# Patient Record
Sex: FEMALE | Race: BLACK OR AFRICAN AMERICAN | NOT HISPANIC OR LATINO | URBAN - METROPOLITAN AREA
[De-identification: names, ages, dates, MRNs, and addresses within clinical notes are randomized per-mention and may not be internally consistent; named-entity substitution may affect disease eponyms.]

---

## 2018-07-17 ENCOUNTER — EMERGENCY (EMERGENCY)
Facility: HOSPITAL | Age: 28
LOS: 1 days | Discharge: HOME | End: 2018-07-17
Attending: EMERGENCY MEDICINE | Admitting: EMERGENCY MEDICINE
Payer: MEDICAID

## 2018-07-17 VITALS
OXYGEN SATURATION: 98 % | SYSTOLIC BLOOD PRESSURE: 138 MMHG | HEART RATE: 68 BPM | RESPIRATION RATE: 18 BRPM | DIASTOLIC BLOOD PRESSURE: 95 MMHG

## 2018-07-17 VITALS
HEART RATE: 110 BPM | SYSTOLIC BLOOD PRESSURE: 143 MMHG | OXYGEN SATURATION: 98 % | TEMPERATURE: 98 F | RESPIRATION RATE: 18 BRPM | DIASTOLIC BLOOD PRESSURE: 95 MMHG

## 2018-07-17 LAB
AMPHET UR-MCNC: NEGATIVE — SIGNIFICANT CHANGE UP
APAP SERPL-MCNC: < 15 UG/ML — LOW (ref 15–25)
BARBITURATES UR SCN-MCNC: NEGATIVE — SIGNIFICANT CHANGE UP
BENZODIAZ UR-MCNC: NEGATIVE — SIGNIFICANT CHANGE UP
CANNABINOIDS UR-MCNC: POSITIVE — SIGNIFICANT CHANGE UP
COCAINE METAB.OTHER UR-MCNC: POSITIVE — SIGNIFICANT CHANGE UP
ETHANOL BLD-MCNC: < 10 MG/DL — SIGNIFICANT CHANGE UP
METHADONE UR-MCNC: NEGATIVE — SIGNIFICANT CHANGE UP
OPIATES UR-MCNC: NEGATIVE — SIGNIFICANT CHANGE UP
OXYCODONE UR-MCNC: NEGATIVE — SIGNIFICANT CHANGE UP
PCP UR-MCNC: NEGATIVE — SIGNIFICANT CHANGE UP
SALICYLATES SERPL-MCNC: < 5 MG/DL — LOW (ref 15–30)
TROPONIN T, HIGH SENSITIVITY: < 6 NG/L — SIGNIFICANT CHANGE UP (ref ?–14)

## 2018-07-17 PROCEDURE — 99284 EMERGENCY DEPT VISIT MOD MDM: CPT

## 2018-07-17 RX ADMIN — Medication 1 MILLIGRAM(S): at 15:17

## 2018-07-17 NOTE — ED PROVIDER NOTE - ATTENDING CONTRIBUTION TO CARE
Dr. Balderas: I have personally seen and examined this patient at the bedside. I have fully participated in the care of this patient. I have reviewed all pertinent clinical information, including history, physical exam, plan and the Resident's note and agree except as noted. HPI above as by me. PE above as by me. DDX drug intoxication and side effect. Neg reported or evidence of abuse. PLAN ativan, tox screen, upreg, observe.
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
## 2018-07-17 NOTE — ED PROVIDER NOTE - PHYSICAL EXAMINATION
Gen: teary, anxious appearing, AOx3  Head: NCAT  HEENT: PERRL, oral mucosa moist, normal conjunctiva  Lung: CTAB, no respiratory distress  CV: tachycardic, no murmurs, Normal perfusion  Abd: soft, NTND, no CVA tenderness  MSK: No edema, no visible deformities  Skin: numerous old scars on forearms  - Lena Phelps, DO

## 2018-07-17 NOTE — ED ADULT NURSE NOTE - CHPI ED SYMPTOMS NEG
no numbness/no tingling/no dizziness/no fever/no weakness/no nausea/no vomiting/no pain/no chills/no decreased eating/drinking
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
## 2018-07-17 NOTE — ED PROVIDER NOTE - CARE PLAN
Principal Discharge DX:	Drug intoxication  Assessment and plan of treatment:	- Please follow up with your primary doctor

## 2018-07-17 NOTE — ED PROVIDER NOTE - PROGRESS NOTE DETAILS
Pt is feeling much better, no longer has chest tightness, shakiness, blurry vision.  Pt informed of all lab results, but does not want a copy of the records.  Pt will be discharged and advised to follow up with her primary doctor.  Pt is calling her sister for a ride home.  - Lena Phelps,

## 2018-07-17 NOTE — ED PROVIDER NOTE - MEDICAL DECISION MAKING DETAILS
25F presents anxious, upset after smoking a blunt this morning.  chest pain, shortness of breath in setting of cocaine use last night.  Requesting drug scree.  Will obtian ekg, trop, tox screen, give ativan and reassess.  - Lena Phelps, DO
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
## 2019-11-01 ENCOUNTER — OUTPATIENT (OUTPATIENT)
Dept: OUTPATIENT SERVICES | Facility: HOSPITAL | Age: 29
LOS: 1 days | End: 2019-11-01

## 2019-11-01 ENCOUNTER — OUTPATIENT (OUTPATIENT)
Dept: OUTPATIENT SERVICES | Facility: HOSPITAL | Age: 29
LOS: 1 days | End: 2019-11-01
Payer: MEDICAID

## 2019-11-22 ENCOUNTER — EMERGENCY (EMERGENCY)
Facility: HOSPITAL | Age: 29
LOS: 1 days | Discharge: HOME | End: 2019-11-22
Attending: EMERGENCY MEDICINE
Payer: MEDICAID

## 2019-11-22 VITALS
HEART RATE: 70 BPM | WEIGHT: 98.11 LBS | RESPIRATION RATE: 16 BRPM | SYSTOLIC BLOOD PRESSURE: 132 MMHG | TEMPERATURE: 98 F | OXYGEN SATURATION: 98 % | DIASTOLIC BLOOD PRESSURE: 77 MMHG | HEIGHT: 64 IN

## 2019-11-22 DIAGNOSIS — Z90.49 ACQUIRED ABSENCE OF OTHER SPECIFIED PARTS OF DIGESTIVE TRACT: Chronic | ICD-10-CM

## 2019-11-22 DIAGNOSIS — Z98.890 OTHER SPECIFIED POSTPROCEDURAL STATES: Chronic | ICD-10-CM

## 2019-11-22 LAB
ACETONE SERPL-MCNC: NEGATIVE — SIGNIFICANT CHANGE UP
ALBUMIN SERPL ELPH-MCNC: 3.5 G/DL — SIGNIFICANT CHANGE UP (ref 3.5–5)
ALP SERPL-CCNC: 95 U/L — SIGNIFICANT CHANGE UP (ref 40–120)
ALT FLD-CCNC: 17 U/L DA — SIGNIFICANT CHANGE UP (ref 10–60)
AMPHET UR-MCNC: NEGATIVE — SIGNIFICANT CHANGE UP
ANION GAP SERPL CALC-SCNC: 8 MMOL/L — SIGNIFICANT CHANGE UP (ref 5–17)
APPEARANCE UR: CLEAR — SIGNIFICANT CHANGE UP
AST SERPL-CCNC: 15 U/L — SIGNIFICANT CHANGE UP (ref 10–40)
BACTERIA # UR AUTO: ABNORMAL /HPF
BARBITURATES UR SCN-MCNC: NEGATIVE — SIGNIFICANT CHANGE UP
BASOPHILS # BLD AUTO: 0.01 K/UL — SIGNIFICANT CHANGE UP (ref 0–0.2)
BASOPHILS NFR BLD AUTO: 0.2 % — SIGNIFICANT CHANGE UP (ref 0–2)
BENZODIAZ UR-MCNC: NEGATIVE — SIGNIFICANT CHANGE UP
BILIRUB SERPL-MCNC: 0.4 MG/DL — SIGNIFICANT CHANGE UP (ref 0.2–1.2)
BILIRUB UR-MCNC: NEGATIVE — SIGNIFICANT CHANGE UP
BUN SERPL-MCNC: 6 MG/DL — LOW (ref 7–18)
CALCIUM SERPL-MCNC: 9 MG/DL — SIGNIFICANT CHANGE UP (ref 8.4–10.5)
CHLORIDE SERPL-SCNC: 106 MMOL/L — SIGNIFICANT CHANGE UP (ref 96–108)
CO2 SERPL-SCNC: 26 MMOL/L — SIGNIFICANT CHANGE UP (ref 22–31)
COCAINE METAB.OTHER UR-MCNC: POSITIVE
COLOR SPEC: YELLOW — SIGNIFICANT CHANGE UP
COMMENT - URINE: SIGNIFICANT CHANGE UP
CREAT SERPL-MCNC: 0.79 MG/DL — SIGNIFICANT CHANGE UP (ref 0.5–1.3)
DIFF PNL FLD: NEGATIVE — SIGNIFICANT CHANGE UP
EOSINOPHIL # BLD AUTO: 0.1 K/UL — SIGNIFICANT CHANGE UP (ref 0–0.5)
EOSINOPHIL NFR BLD AUTO: 1.6 % — SIGNIFICANT CHANGE UP (ref 0–6)
EPI CELLS # UR: ABNORMAL /HPF
ERYTHROCYTE [SEDIMENTATION RATE] IN BLOOD: 30 MM/HR — HIGH (ref 0–15)
ETHANOL SERPL-MCNC: <3 MG/DL — SIGNIFICANT CHANGE UP (ref 0–10)
GLUCOSE SERPL-MCNC: 82 MG/DL — SIGNIFICANT CHANGE UP (ref 70–99)
GLUCOSE UR QL: NEGATIVE — SIGNIFICANT CHANGE UP
HCG SERPL-ACNC: <1 MIU/ML — SIGNIFICANT CHANGE UP
HCT VFR BLD CALC: 30.7 % — LOW (ref 34.5–45)
HGB BLD-MCNC: 9.8 G/DL — LOW (ref 11.5–15.5)
HIV 1 & 2 AB SERPL IA.RAPID: REACTIVE
IMM GRANULOCYTES NFR BLD AUTO: 0.3 % — SIGNIFICANT CHANGE UP (ref 0–1.5)
KETONES UR-MCNC: ABNORMAL
LACTATE SERPL-SCNC: 1.1 MMOL/L — SIGNIFICANT CHANGE UP (ref 0.7–2)
LEUKOCYTE ESTERASE UR-ACNC: NEGATIVE — SIGNIFICANT CHANGE UP
LYMPHOCYTES # BLD AUTO: 0.71 K/UL — LOW (ref 1–3.3)
LYMPHOCYTES # BLD AUTO: 11.3 % — LOW (ref 13–44)
MAGNESIUM SERPL-MCNC: 2.1 MG/DL — SIGNIFICANT CHANGE UP (ref 1.6–2.6)
MCHC RBC-ENTMCNC: 29.4 PG — SIGNIFICANT CHANGE UP (ref 27–34)
MCHC RBC-ENTMCNC: 31.9 GM/DL — LOW (ref 32–36)
MCV RBC AUTO: 92.2 FL — SIGNIFICANT CHANGE UP (ref 80–100)
METHADONE UR-MCNC: NEGATIVE — SIGNIFICANT CHANGE UP
MONOCYTES # BLD AUTO: 0.61 K/UL — SIGNIFICANT CHANGE UP (ref 0–0.9)
MONOCYTES NFR BLD AUTO: 9.7 % — SIGNIFICANT CHANGE UP (ref 2–14)
NEUTROPHILS # BLD AUTO: 4.85 K/UL — SIGNIFICANT CHANGE UP (ref 1.8–7.4)
NEUTROPHILS NFR BLD AUTO: 76.9 % — SIGNIFICANT CHANGE UP (ref 43–77)
NITRITE UR-MCNC: NEGATIVE — SIGNIFICANT CHANGE UP
NRBC # BLD: 0 /100 WBCS — SIGNIFICANT CHANGE UP (ref 0–0)
OPIATES UR-MCNC: NEGATIVE — SIGNIFICANT CHANGE UP
PCP SPEC-MCNC: SIGNIFICANT CHANGE UP
PCP UR-MCNC: NEGATIVE — SIGNIFICANT CHANGE UP
PH UR: 6.5 — SIGNIFICANT CHANGE UP (ref 5–8)
PLATELET # BLD AUTO: 232 K/UL — SIGNIFICANT CHANGE UP (ref 150–400)
POTASSIUM SERPL-MCNC: 3.5 MMOL/L — SIGNIFICANT CHANGE UP (ref 3.5–5.3)
POTASSIUM SERPL-SCNC: 3.5 MMOL/L — SIGNIFICANT CHANGE UP (ref 3.5–5.3)
PROT SERPL-MCNC: 7.8 G/DL — SIGNIFICANT CHANGE UP (ref 6–8.3)
PROT UR-MCNC: 15
RBC # BLD: 3.33 M/UL — LOW (ref 3.8–5.2)
RBC # FLD: 13.3 % — SIGNIFICANT CHANGE UP (ref 10.3–14.5)
RBC CASTS # UR COMP ASSIST: SIGNIFICANT CHANGE UP /HPF (ref 0–2)
SODIUM SERPL-SCNC: 140 MMOL/L — SIGNIFICANT CHANGE UP (ref 135–145)
SP GR SPEC: 1.01 — SIGNIFICANT CHANGE UP (ref 1.01–1.02)
THC UR QL: POSITIVE
UROBILINOGEN FLD QL: NEGATIVE — SIGNIFICANT CHANGE UP
WBC # BLD: 6.3 K/UL — SIGNIFICANT CHANGE UP (ref 3.8–10.5)
WBC # FLD AUTO: 6.3 K/UL — SIGNIFICANT CHANGE UP (ref 3.8–10.5)
WBC UR QL: SIGNIFICANT CHANGE UP /HPF (ref 0–5)

## 2019-11-22 PROCEDURE — 80307 DRUG TEST PRSMV CHEM ANLYZR: CPT

## 2019-11-22 PROCEDURE — 96374 THER/PROPH/DIAG INJ IV PUSH: CPT

## 2019-11-22 PROCEDURE — 71045 X-RAY EXAM CHEST 1 VIEW: CPT

## 2019-11-22 PROCEDURE — 99285 EMERGENCY DEPT VISIT HI MDM: CPT | Mod: 25

## 2019-11-22 PROCEDURE — 85027 COMPLETE CBC AUTOMATED: CPT

## 2019-11-22 PROCEDURE — 86703 HIV-1/HIV-2 1 RESULT ANTBDY: CPT

## 2019-11-22 PROCEDURE — 80053 COMPREHEN METABOLIC PANEL: CPT

## 2019-11-22 PROCEDURE — 36415 COLL VENOUS BLD VENIPUNCTURE: CPT

## 2019-11-22 PROCEDURE — 71045 X-RAY EXAM CHEST 1 VIEW: CPT | Mod: 26

## 2019-11-22 PROCEDURE — 83605 ASSAY OF LACTIC ACID: CPT

## 2019-11-22 PROCEDURE — 81001 URINALYSIS AUTO W/SCOPE: CPT

## 2019-11-22 PROCEDURE — 83735 ASSAY OF MAGNESIUM: CPT

## 2019-11-22 PROCEDURE — 99285 EMERGENCY DEPT VISIT HI MDM: CPT

## 2019-11-22 PROCEDURE — 96375 TX/PRO/DX INJ NEW DRUG ADDON: CPT

## 2019-11-22 PROCEDURE — 82009 KETONE BODYS QUAL: CPT

## 2019-11-22 PROCEDURE — 85652 RBC SED RATE AUTOMATED: CPT

## 2019-11-22 PROCEDURE — 87086 URINE CULTURE/COLONY COUNT: CPT

## 2019-11-22 PROCEDURE — 84702 CHORIONIC GONADOTROPIN TEST: CPT

## 2019-11-22 PROCEDURE — 87040 BLOOD CULTURE FOR BACTERIA: CPT

## 2019-11-22 RX ORDER — AZITHROMYCIN 500 MG/1
500 TABLET, FILM COATED ORAL ONCE
Refills: 0 | Status: COMPLETED | OUTPATIENT
Start: 2019-11-22 | End: 2019-11-22

## 2019-11-22 RX ORDER — CEFTRIAXONE 500 MG/1
1000 INJECTION, POWDER, FOR SOLUTION INTRAMUSCULAR; INTRAVENOUS ONCE
Refills: 0 | Status: COMPLETED | OUTPATIENT
Start: 2019-11-22 | End: 2019-11-22

## 2019-11-22 RX ORDER — SODIUM CHLORIDE 9 MG/ML
1400 INJECTION INTRAMUSCULAR; INTRAVENOUS; SUBCUTANEOUS ONCE
Refills: 0 | Status: COMPLETED | OUTPATIENT
Start: 2019-11-22 | End: 2019-11-22

## 2019-11-22 RX ORDER — SODIUM CHLORIDE 9 MG/ML
1000 INJECTION INTRAMUSCULAR; INTRAVENOUS; SUBCUTANEOUS
Refills: 0 | Status: DISCONTINUED | OUTPATIENT
Start: 2019-11-22 | End: 2019-11-28

## 2019-11-22 RX ADMIN — SODIUM CHLORIDE 2800 MILLILITER(S): 9 INJECTION INTRAMUSCULAR; INTRAVENOUS; SUBCUTANEOUS at 11:55

## 2019-11-22 RX ADMIN — AZITHROMYCIN 255 MILLIGRAM(S): 500 TABLET, FILM COATED ORAL at 11:54

## 2019-11-22 RX ADMIN — SODIUM CHLORIDE 1250 MILLILITER(S): 9 INJECTION INTRAMUSCULAR; INTRAVENOUS; SUBCUTANEOUS at 11:54

## 2019-11-22 RX ADMIN — CEFTRIAXONE 100 MILLIGRAM(S): 500 INJECTION, POWDER, FOR SOLUTION INTRAMUSCULAR; INTRAVENOUS at 11:54

## 2019-11-22 NOTE — ED ADULT NURSE NOTE - NSIMPLEMENTINTERV_GEN_ALL_ED
Implemented All Universal Safety Interventions:  [place] to call system. Call bell, personal items and telephone within reach. Instruct patient to call for assistance. Room bathroom lighting operational. Non-slip footwear when patient is off stretcher. Physically safe environment: no spills, clutter or unnecessary equipment. Stretcher in lowest position, wheels locked, appropriate side rails in place.

## 2019-11-22 NOTE — ED PROVIDER NOTE - MUSCULOSKELETAL, MLM
Spine appears normal, range of motion is not limited, no muscle or joint tenderness Spine appears normal, range of motion is not limited, no muscle or joint tenderness, ? LCVAT

## 2019-11-22 NOTE — ED PROVIDER NOTE - CLINICAL SUMMARY MEDICAL DECISION MAKING FREE TEXT BOX
pt with lots of complaints, had CT head/A/P sone earlier @ [facility]-neg, c/o weakness, coughing, will get labs, admission

## 2019-11-22 NOTE — ED ADULT TRIAGE NOTE - CHIEF COMPLAINT QUOTE
" my nervous system is shutting down ", " I have full blown AIDS and my motors skills are bad " , I need help taking care of myself

## 2019-11-22 NOTE — ED PROVIDER NOTE - NEUROLOGICAL, MLM
Alert and oriented, no focal deficits, no motor or sensory deficits. Alert and oriented, no focal deficits, no motor or sensory deficits. pt with self induced tremors, able to ambulate

## 2019-11-22 NOTE — ED PROVIDER NOTE - PROGRESS NOTE DETAILS
Pt claims she needs to be babysat 2/2 weakness, requests to be admitted, d/w Dr. Leo, pt does not required admission, pt is able to ambulate, will d/c home.  Pt with no urinary/fecal incontinence in ED
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
## 2019-11-23 LAB
CULTURE RESULTS: SIGNIFICANT CHANGE UP
SPECIMEN SOURCE: SIGNIFICANT CHANGE UP

## 2019-11-25 DIAGNOSIS — Z71.89 OTHER SPECIFIED COUNSELING: ICD-10-CM

## 2019-11-26 DIAGNOSIS — Z76.89 PERSONS ENCOUNTERING HEALTH SERVICES IN OTHER SPECIFIED CIRCUMSTANCES: ICD-10-CM

## 2019-11-27 LAB
CULTURE RESULTS: SIGNIFICANT CHANGE UP
CULTURE RESULTS: SIGNIFICANT CHANGE UP
SPECIMEN SOURCE: SIGNIFICANT CHANGE UP
SPECIMEN SOURCE: SIGNIFICANT CHANGE UP

## 2020-01-10 ENCOUNTER — EMERGENCY (EMERGENCY)
Facility: HOSPITAL | Age: 30
LOS: 1 days | Discharge: HOME | End: 2020-01-10
Attending: EMERGENCY MEDICINE | Admitting: EMERGENCY MEDICINE
Payer: MEDICAID

## 2020-01-10 VITALS
TEMPERATURE: 98 F | HEART RATE: 90 BPM | OXYGEN SATURATION: 100 % | SYSTOLIC BLOOD PRESSURE: 148 MMHG | DIASTOLIC BLOOD PRESSURE: 70 MMHG | RESPIRATION RATE: 18 BRPM

## 2020-01-10 DIAGNOSIS — Z98.890 OTHER SPECIFIED POSTPROCEDURAL STATES: Chronic | ICD-10-CM

## 2020-01-10 DIAGNOSIS — Z90.49 ACQUIRED ABSENCE OF OTHER SPECIFIED PARTS OF DIGESTIVE TRACT: Chronic | ICD-10-CM

## 2020-01-10 PROBLEM — B20 HUMAN IMMUNODEFICIENCY VIRUS [HIV] DISEASE: Chronic | Status: ACTIVE | Noted: 2019-11-22

## 2020-01-10 LAB
ALBUMIN SERPL ELPH-MCNC: 4.7 G/DL — SIGNIFICANT CHANGE UP (ref 3.3–5)
ALP SERPL-CCNC: 78 U/L — SIGNIFICANT CHANGE UP (ref 40–120)
ALT FLD-CCNC: 8 U/L — SIGNIFICANT CHANGE UP (ref 4–33)
ANION GAP SERPL CALC-SCNC: 13 MMO/L — SIGNIFICANT CHANGE UP (ref 7–14)
APPEARANCE UR: CLEAR — SIGNIFICANT CHANGE UP
AST SERPL-CCNC: 21 U/L — SIGNIFICANT CHANGE UP (ref 4–32)
BASOPHILS # BLD AUTO: 0.03 K/UL — SIGNIFICANT CHANGE UP (ref 0–0.2)
BASOPHILS NFR BLD AUTO: 0.8 % — SIGNIFICANT CHANGE UP (ref 0–2)
BILIRUB SERPL-MCNC: 0.4 MG/DL — SIGNIFICANT CHANGE UP (ref 0.2–1.2)
BILIRUB UR-MCNC: NEGATIVE — SIGNIFICANT CHANGE UP
BLOOD UR QL VISUAL: NEGATIVE — SIGNIFICANT CHANGE UP
BUN SERPL-MCNC: 10 MG/DL — SIGNIFICANT CHANGE UP (ref 7–23)
CALCIUM SERPL-MCNC: 10 MG/DL — SIGNIFICANT CHANGE UP (ref 8.4–10.5)
CHLORIDE SERPL-SCNC: 102 MMOL/L — SIGNIFICANT CHANGE UP (ref 98–107)
CO2 SERPL-SCNC: 23 MMOL/L — SIGNIFICANT CHANGE UP (ref 22–31)
COLOR SPEC: COLORLESS — SIGNIFICANT CHANGE UP
CREAT SERPL-MCNC: 0.83 MG/DL — SIGNIFICANT CHANGE UP (ref 0.5–1.3)
EOSINOPHIL # BLD AUTO: 0.07 K/UL — SIGNIFICANT CHANGE UP (ref 0–0.5)
EOSINOPHIL NFR BLD AUTO: 1.9 % — SIGNIFICANT CHANGE UP (ref 0–6)
GLUCOSE SERPL-MCNC: 84 MG/DL — SIGNIFICANT CHANGE UP (ref 70–99)
GLUCOSE UR-MCNC: NEGATIVE — SIGNIFICANT CHANGE UP
HCT VFR BLD CALC: 35.6 % — SIGNIFICANT CHANGE UP (ref 34.5–45)
HGB BLD-MCNC: 11.5 G/DL — SIGNIFICANT CHANGE UP (ref 11.5–15.5)
IMM GRANULOCYTES NFR BLD AUTO: 0 % — SIGNIFICANT CHANGE UP (ref 0–1.5)
KETONES UR-MCNC: NEGATIVE — SIGNIFICANT CHANGE UP
LEUKOCYTE ESTERASE UR-ACNC: NEGATIVE — SIGNIFICANT CHANGE UP
LYMPHOCYTES # BLD AUTO: 0.76 K/UL — LOW (ref 1–3.3)
LYMPHOCYTES # BLD AUTO: 21.1 % — SIGNIFICANT CHANGE UP (ref 13–44)
MCHC RBC-ENTMCNC: 30.9 PG — SIGNIFICANT CHANGE UP (ref 27–34)
MCHC RBC-ENTMCNC: 32.3 % — SIGNIFICANT CHANGE UP (ref 32–36)
MCV RBC AUTO: 95.7 FL — SIGNIFICANT CHANGE UP (ref 80–100)
MONOCYTES # BLD AUTO: 0.42 K/UL — SIGNIFICANT CHANGE UP (ref 0–0.9)
MONOCYTES NFR BLD AUTO: 11.6 % — SIGNIFICANT CHANGE UP (ref 2–14)
NEUTROPHILS # BLD AUTO: 2.33 K/UL — SIGNIFICANT CHANGE UP (ref 1.8–7.4)
NEUTROPHILS NFR BLD AUTO: 64.6 % — SIGNIFICANT CHANGE UP (ref 43–77)
NITRITE UR-MCNC: NEGATIVE — SIGNIFICANT CHANGE UP
NRBC # FLD: 0 K/UL — SIGNIFICANT CHANGE UP (ref 0–0)
PH UR: 7.5 — SIGNIFICANT CHANGE UP (ref 5–8)
PLATELET # BLD AUTO: 294 K/UL — SIGNIFICANT CHANGE UP (ref 150–400)
PMV BLD: 10.5 FL — SIGNIFICANT CHANGE UP (ref 7–13)
POTASSIUM SERPL-MCNC: 3.5 MMOL/L — SIGNIFICANT CHANGE UP (ref 3.5–5.3)
POTASSIUM SERPL-SCNC: 3.5 MMOL/L — SIGNIFICANT CHANGE UP (ref 3.5–5.3)
PROT SERPL-MCNC: 8.4 G/DL — HIGH (ref 6–8.3)
PROT UR-MCNC: NEGATIVE — SIGNIFICANT CHANGE UP
RBC # BLD: 3.72 M/UL — LOW (ref 3.8–5.2)
RBC # FLD: 14.4 % — SIGNIFICANT CHANGE UP (ref 10.3–14.5)
SODIUM SERPL-SCNC: 138 MMOL/L — SIGNIFICANT CHANGE UP (ref 135–145)
SP GR SPEC: 1.01 — SIGNIFICANT CHANGE UP (ref 1–1.04)
UROBILINOGEN FLD QL: NORMAL — SIGNIFICANT CHANGE UP
WBC # BLD: 3.61 K/UL — LOW (ref 3.8–10.5)
WBC # FLD AUTO: 3.61 K/UL — LOW (ref 3.8–10.5)

## 2020-01-10 PROCEDURE — 99285 EMERGENCY DEPT VISIT HI MDM: CPT

## 2020-01-10 PROCEDURE — 71250 CT THORAX DX C-: CPT | Mod: 26

## 2020-01-10 RX ORDER — SODIUM CHLORIDE 9 MG/ML
1000 INJECTION INTRAMUSCULAR; INTRAVENOUS; SUBCUTANEOUS ONCE
Refills: 0 | Status: COMPLETED | OUTPATIENT
Start: 2020-01-10 | End: 2020-01-10

## 2020-01-10 RX ORDER — HALOPERIDOL DECANOATE 100 MG/ML
5 INJECTION INTRAMUSCULAR ONCE
Refills: 0 | Status: COMPLETED | OUTPATIENT
Start: 2020-01-10 | End: 2020-01-10

## 2020-01-10 RX ORDER — IPRATROPIUM/ALBUTEROL SULFATE 18-103MCG
3 AEROSOL WITH ADAPTER (GRAM) INHALATION ONCE
Refills: 0 | Status: COMPLETED | OUTPATIENT
Start: 2020-01-10 | End: 2020-01-10

## 2020-01-10 RX ADMIN — SODIUM CHLORIDE 1000 MILLILITER(S): 9 INJECTION INTRAMUSCULAR; INTRAVENOUS; SUBCUTANEOUS at 22:37

## 2020-01-10 RX ADMIN — HALOPERIDOL DECANOATE 5 MILLIGRAM(S): 100 INJECTION INTRAMUSCULAR at 19:40

## 2020-01-10 RX ADMIN — Medication 2 MILLIGRAM(S): at 19:40

## 2020-01-10 RX ADMIN — Medication 3 MILLILITER(S): at 18:37

## 2020-01-10 NOTE — ED PROVIDER NOTE - ATTENDING CONTRIBUTION TO CARE
Attending Statement: I have personally seen and examined this patient. I have fully participated in the care of this patient. I have reviewed all pertinent clinical information, including history physical exam, plan and the Resident's note and agree except as noted  28yo F hx of AIDs on HAART with reported CD4 16 and viral load 40 , GERD, ?DM, from home co sob and wheezing. Very poor historian. Stating "I need oxygen and an inhaler" Denies fever no chills no cough Feels SOB, no vomit asking for food. no diarrhea Denies preg.   Vital signs noted. laying down, no distress. mmm. pulse ox 100 RA. normal S1-S2 coarse bs no retractions. thin female nontender abdomen. no pedal edema. no calf tenderness. normal pulses bilateral feet. Ao3  plan labs, ct chest, tele monitoring, re assess Attending Statement: I have personally seen and examined this patient. I have fully participated in the care of this patient. I have reviewed all pertinent clinical information, including history physical exam, plan and the Resident's note and agree except as noted  30yo F hx of AIDs on HAART with reported CD4 16 and viral load 40 , GERD, ?DM, from home co sob and wheezing. Very poor historian. Stating "I need oxygen and an inhaler" Denies fever no chills no cough Feels SOB, no vomit asking for food. no diarrhea Denies preg. Evaluated pt w Dr Argueta  Vital signs noted. laying down, no distress. mmm. pulse ox 100 RA. normal S1-S2 coarse bs no retractions. thin female nontender abdomen. no pedal edema. no calf tenderness. normal pulses bilateral feet. Ao3 +flight of ideas able to redirect pt. pleasant, smiling  during interview.   plan labs, ct chest, tele monitoring, re assess

## 2020-01-10 NOTE — ED ADULT NURSE NOTE - NSIMPLEMENTINTERV_GEN_ALL_ED
Implemented All Fall Risk Interventions:  [place] to call system. Call bell, personal items and telephone within reach. Instruct patient to call for assistance. Room bathroom lighting operational. Non-slip footwear when patient is off stretcher. Physically safe environment: no spills, clutter or unnecessary equipment. Stretcher in lowest position, wheels locked, appropriate side rails in place. Provide visual cue, wrist band, yellow gown, etc. Monitor gait and stability. Monitor for mental status changes and reorient to person, place, and time. Review medications for side effects contributing to fall risk. Reinforce activity limits and safety measures with patient and family.

## 2020-01-10 NOTE — ED BEHAVIORAL HEALTH NOTE - BEHAVIORAL HEALTH NOTE
Attempted to interview patient but she was unable to sustain attention to have meaningful encounter, due to sedation. Will reattempt later in evening.

## 2020-01-10 NOTE — ED PROVIDER NOTE - NSFOLLOWUPINSTRUCTIONS_ED_ALL_ED_FT
Home Please follow up with the [facility] inn 48 hours. Return to the Emergency Department for any worsening or concerning symptoms. Continue to take all of you previously prescribed medications as directed.

## 2020-01-10 NOTE — ED ADULT TRIAGE NOTE - CHIEF COMPLAINT QUOTE
Pt refusing to answer questions in triage stating "respect my HIPAA" and breathing with ease on room air. As per EMS, pt c/o SOB and burning sensation to the chest. PMH HIV+ on biktarvy, ovarian CA GERD, manic depression/anxiety/ptsd.

## 2020-01-10 NOTE — ED PROVIDER NOTE - PHYSICAL EXAMINATION
GEN APPEARANCE: WDWN, alert and cooperative, non-toxic appearing and in NAD, eccentric appearance with pink fabio bear at bedside   HEAD: Atraumatic, normocephalic   EYES: PERRLa, EOMI, vision grossly intact.   EARS: Gross hearing intact.   NOSE: No nasal discharge, no external evidence of epistaxis.   NECK: Supple  CV: RRR, S1S2, no c/r/m/g. No cyanosis or pallor. Extremities warm, well perfused. Cap refill <2 seconds. No bruits.   LUNGS: CTAB. No wheezing. No rales. No rhonchi. No diminished breath sounds.   ABDOMEN: diffuse abdominal discomfort, No guarding or rebound. No masses.   MSK: Spine appears normal, no spine point tenderness. No CVA ttp. No joint erythema or tenderness. Normal muscular development. Pelvis stable.  EXTREMITIES: No peripheral edema. No obvious joint or bony deformity.  NEURO: Alert, follows commands. Weight bearing normal. Speech normal. Sensation and motor normal x4 extremities.   SKIN: Normal color for race, warm, dry and intact. No evidence of rash.  PSYCH: eccentric requires frequent redirection poor historian non linear conversation .

## 2020-01-10 NOTE — ED PROVIDER NOTE - NSFOLLOWUPCLINICS_GEN_ALL_ED_FT
[facility]  Behavioral Health  [address] [place] [address]  Phone: [phone]  Fax:   Follow Up Time: 1-3 Days

## 2020-01-10 NOTE — ED PROVIDER NOTE - OBJECTIVE STATEMENT
29F complex medical hx, HIV/AIDS on HAART last CD4 16, viral load 40? reports hx of cva, "cancerous cells everywhere", "parkinsonian like shaking", herniated discs, gerd, high blood sugar, hernia repair, "I have every medical problem imaginable" presents with a  cc of chest discomfort and shortness of breath, says "I need oxygen and a rescue inhaler", came to hospital also because living situation is "bad dirty, I share a room with a 14 year old boy and there are x3 dogs and mold everywhere", intermittently will interrupt conversation to say "im sick I need help" poor historian, requires frequent redirection. +abdominal pain, unable to characterize or localize, Denies n/v/f/c. Denies headache, syncope, lightheadedness, dizziness. Denies chest palpitations, Denies dysuria, hematuria, hematochezia, BRBPR, tarry stools, diarrhea, constipation. Reports is not on any ppx abx, "all medications make me sick except for my AIDS meds"

## 2020-01-10 NOTE — ED PROVIDER NOTE - PROGRESS NOTE DETAILS
PT gradually becoming more aggressive. Hyperreligious and threatening to the staff, threatening to bite and kill everyone. Yelling profanity and racial slurs to staff. Grandiose statements, She is very good friends w  of hospital. Unable to calm  pt, will medicate and place in .  attending made aware. pt pending ct chest, she refused, yelling and cursing at transporter. pt sedated, off 4 points. BP in 90's will give IVF and re assess. pt still sedated in BH w improved BP. Pending tox and BH evaluation. Endorse to Dr Walls Srinivasan: pt carrie dout to me pending psych assessment. pt has AIDS with new psychosis/agitation requiring sedation. will obtain CT head and serum tox. Srinivasan: pt reassessed, calm and cooperative, CT head unremarkable. PT seen and cleared by psych. will d/c.

## 2020-01-10 NOTE — ED PROVIDER NOTE - PATIENT PORTAL LINK FT
You can access the FollowMyHealth Patient Portal offered by [facility] by registering at the following website: [email or web address]. By joining [facility]’s FollowMyHealth portal, you will also be able to view your health information using other applications (apps) compatible with our system.

## 2020-01-10 NOTE — ED PROVIDER NOTE - CLINICAL SUMMARY MEDICAL DECISION MAKING FREE TEXT BOX
[place] PGY3: 29F complex medical hx, HIV on HAART last CD4 16, viral load 40? reports hx of cva, "cancerous cells everywhere", "parkinsonian like shaking", herniated discs, gerd, high blood sugar, hernia repair, "I have every medical problem imaginable" presents with a  cc of chest discomfort and shortness of breath, says "I need oxygen and a rescue inhaler" exam vss non toxic non focal exam, appearance and behavior as above, unclear etiology of SOB, not wheezy, will eval for infxn/pcp pna check labs cd4/viral load, ct chest, ua reassess

## 2020-01-11 VITALS
HEART RATE: 70 BPM | RESPIRATION RATE: 16 BRPM | DIASTOLIC BLOOD PRESSURE: 62 MMHG | SYSTOLIC BLOOD PRESSURE: 104 MMHG | OXYGEN SATURATION: 100 % | TEMPERATURE: 98 F

## 2020-01-11 DIAGNOSIS — F43.20 ADJUSTMENT DISORDER, UNSPECIFIED: ICD-10-CM

## 2020-01-11 LAB
AMPHET UR-MCNC: NEGATIVE — SIGNIFICANT CHANGE UP
APAP SERPL-MCNC: < 15 UG/ML — LOW (ref 15–25)
BARBITURATES UR SCN-MCNC: NEGATIVE — SIGNIFICANT CHANGE UP
BENZODIAZ UR-MCNC: NEGATIVE — SIGNIFICANT CHANGE UP
CANNABINOIDS UR-MCNC: POSITIVE — SIGNIFICANT CHANGE UP
COCAINE METAB.OTHER UR-MCNC: NEGATIVE — SIGNIFICANT CHANGE UP
ETHANOL BLD-MCNC: < 10 MG/DL — SIGNIFICANT CHANGE UP
HIV-1 VIRAL LOAD RESULT: SIGNIFICANT CHANGE UP
HIV1 RNA # SERPL NAA+PROBE: SIGNIFICANT CHANGE UP
HIV1 RNA SER-IMP: SIGNIFICANT CHANGE UP
HIV1 RNA SERPL NAA+PROBE-ACNC: SIGNIFICANT CHANGE UP
HIV1 RNA SERPL NAA+PROBE-LOG#: SIGNIFICANT CHANGE UP
METHADONE UR-MCNC: NEGATIVE — SIGNIFICANT CHANGE UP
OPIATES UR-MCNC: NEGATIVE — SIGNIFICANT CHANGE UP
OXYCODONE UR-MCNC: NEGATIVE — SIGNIFICANT CHANGE UP
PCP UR-MCNC: NEGATIVE — SIGNIFICANT CHANGE UP
SALICYLATES SERPL-MCNC: < 5 MG/DL — LOW (ref 15–30)
TSH SERPL-MCNC: 4.63 UIU/ML — HIGH (ref 0.27–4.2)

## 2020-01-11 PROCEDURE — 70450 CT HEAD/BRAIN W/O DYE: CPT | Mod: 26

## 2020-01-11 PROCEDURE — 90792 PSYCH DIAG EVAL W/MED SRVCS: CPT

## 2020-01-11 NOTE — ED BEHAVIORAL HEALTH ASSESSMENT NOTE - DETAILS
n/a pt agitated and somewhat aggressive in the ED when she first arrived during medical evaluation chronic back pain self-referred, ED physician notified of clearance to be discharged

## 2020-01-11 NOTE — ED BEHAVIORAL HEALTH ASSESSMENT NOTE - DESCRIPTION (FIRST USE, LAST USE, QUANTITY, FREQUENCY, DURATION)
put a very small amount of red wine in her tea each morning (reports it adds up to a total of one glass per week) puts a small amount of marijuana in tea each morning hx of daily use, in early remission, has not used for a least several months
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
## 2020-01-11 NOTE — ED BEHAVIORAL HEALTH NOTE - BEHAVIORAL HEALTH NOTE
Per request of provider, SHEBA provided [employer] #[id number] to nursing staff for patient discharge.

## 2020-01-11 NOTE — ED ADULT NURSE REASSESSMENT NOTE - NS ED NURSE REASSESS COMMENT FT1
0830 Received report from night RN pt calm & cooperative lying on stretcher in nad requesting to be discharge, pt denies si/hi/avh presently, safety & comfort measures maintained eval on going
Evaluated by Psych cleared by MD. Pt calm & cooperative denies s/i h/avh presently,  pt d/c by MD resources & d/c instructions, including [employer]card provided pt verbalizing understanding.
PT has been resting comfortably in bed absent any distress
PT woke up verbally aggressive, and demanding to see her MD. PT refusing to do blood work or submit a urine sample.  PT able to be re-directed and after speaking with MD PT fell back asleep.
Patient with manic behavior, stating her hipa rights have been violated, Patient stating she wanted patient advocate, called [phone] while being triaged . IV placed, labs sent as ordered. Patient became verbally abusive and verbally aggressive when transported came to take her to ct scan. Patient yelling at staff in the hallway and uncooperative. Patient using foul language and shouting at security. Patient restrained for safety and brought to  area.
md made aware of pt most recent vital signs. pt able to answer questions and is still verbally abusive. pt pleads to be left alone at this time. awaiting md orders in relation to her BP. pt remains in front of nurses station and will continue to monitor.
pt calm in stretcher at this time. pt moved in front of the nurses station. pt belongings counted with [employer]. belongings sealed in envelope locked up. will continue to monitor pt
pt resting comfortably in bed absent any distress or C/O pain. safety measures in place, able to make needs known with care provided PRN PT's B/P returned to normal level after fluid bolus
Pt medicated and placed in 4 point restraints and transferred safely to Behavioral Health Area with security and RN escort. Pt remained in Constant Observation. Safety maintained, Pt & restraints checked q 15 mins. @ 2025 Pt calm and sleeping, arousable to verbal stimuli, able to be redirected and agreed to change into Behavioral Health Pt attire. Belongings taken off patient, documented and secured. Restraints D/C, pt showing no signs of aggression or threats to self or others. Pt stating she is tired and wants to sleep. Education provided about Behavioral Health area and processes. Safety maintained. report given to primary RN Jonatan. Psych Provider to assess. Pt moved inside locked unit and placed in front of nursing station. Currently resting comfortably.
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
## 2020-01-11 NOTE — ED BEHAVIORAL HEALTH ASSESSMENT NOTE - RISK ASSESSMENT
Risk factors include remote hx of psychiatric illness requiring several hospitalizations and medication management, remote hx of NSSIB, limited education and unemployment, chronic medical illness (HIV) and pain (back pain), hx of substance abuse, and what appears to be low frustration tolerance with related agitation at times. Protective factors include no SI/I/P and HI/I/P, no hx of SAs, no recent hospitalizations, no evidence of an acute mood or psychotic disorder/episode, stable domicile, strong social supports (aunt, parents who lives in [place], sister who lives in [place]), beloved dogs (who she identifies as protective), future orientation, Sabianism (with related supportive Jew community), minimal substance use (small amount of EtOH and marijuana, no longer using cocaine), and no access to a gun. At this time, protective factors mitigate risks and pt can be discharged home. Pt provided with Crisis Center information and plans to see a psychologist "Mr. DALLAS" for initial evaluation at [facility] on this coming Monday 1/13. Low Acute Suicide Risk

## 2020-01-11 NOTE — ED BEHAVIORAL HEALTH ASSESSMENT NOTE - SUICIDE PROTECTIVE FACTORS
Supportive social network of family or friends/Identifies reasons for living/Responsibility to family and others/Has future plans/Beloved pets/Adventist beliefs

## 2020-01-11 NOTE — ED BEHAVIORAL HEALTH ASSESSMENT NOTE - SAFETY PLAN DETAILS
Pt agreeable to return to the ED or call 911 if she believes herself or others to be in imminent danger. Reports ability to ask her aunt and/or her parents or sister for help if need be. Plans to see a psychologist at [facility] on Monday.
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
## 2020-01-11 NOTE — ED BEHAVIORAL HEALTH ASSESSMENT NOTE - REFERRAL / APPOINTMENT DETAILS
Pt is not interested in referral to a psychiatrist and plans to attend an appt on Monday 1/13/20 at [facility] with a psychologist Pt is not interested in referral to a psychiatrist and plans to attend an appt on Monday 1/13/20 at [facility] with a psychologist.  Advised to availability of [facility] available for walk-ins Monday-Friday, 9am-7pm.

## 2020-01-11 NOTE — ED BEHAVIORAL HEALTH ASSESSMENT NOTE - NS ED [facility] ED COURSE PSYCHIATRIC MEDICATION GIVEN
Yes Involuntary Intramuscular (indication, name, dose, time) Advancement Flap (Single) Text: The defect edges were debeveled with a #15 scalpel blade.  Given the location of the defect and the proximity to free margins a single advancement flap was deemed most appropriate.  Using a sterile surgical marker, an appropriate advancement flap was drawn incorporating the defect and placing the expected incisions within the relaxed skin tension lines where possible.    The area thus outlined was incised deep to adipose tissue with a #15 scalpel blade.  The skin margins were undermined to an appropriate distance in all directions utilizing iris scissors.

## 2020-01-11 NOTE — ED BEHAVIORAL HEALTH ASSESSMENT NOTE - OTHER PAST PSYCHIATRIC HISTORY (INCLUDE DETAILS REGARDING ONSET, COURSE OF ILLNESS, INPATIENT/OUTPATIENT TREATMENT)
PPHx bipolar disorder (per hx of evaluation more than 15 years ago), hx of approximately 4 hospitalization between the ages of 11 and 16 (none recent), no hx of SAs, hx of NSSIB via cutting as a child/adolescent, not in any outpt psych treatment since age 16 and not prescribed any psychotropic meds at present

## 2020-01-12 LAB
BACTERIA UR CULT: SIGNIFICANT CHANGE UP
SPECIMEN SOURCE: SIGNIFICANT CHANGE UP

## 2020-01-20 ENCOUNTER — INPATIENT (INPATIENT)
Facility: HOSPITAL | Age: 30
LOS: 10 days | Discharge: HOME | End: 2020-01-31
Attending: PSYCHIATRY & NEUROLOGY | Admitting: PSYCHIATRY & NEUROLOGY
Payer: MEDICAID

## 2020-01-20 VITALS
OXYGEN SATURATION: 100 % | HEART RATE: 77 BPM | SYSTOLIC BLOOD PRESSURE: 131 MMHG | DIASTOLIC BLOOD PRESSURE: 91 MMHG | TEMPERATURE: 98 F | RESPIRATION RATE: 163 BRPM

## 2020-01-20 DIAGNOSIS — F10.10 ALCOHOL ABUSE, UNCOMPLICATED: ICD-10-CM

## 2020-01-20 DIAGNOSIS — F12.10 CANNABIS ABUSE, UNCOMPLICATED: ICD-10-CM

## 2020-01-20 DIAGNOSIS — Z90.49 ACQUIRED ABSENCE OF OTHER SPECIFIED PARTS OF DIGESTIVE TRACT: Chronic | ICD-10-CM

## 2020-01-20 DIAGNOSIS — F29 UNSPECIFIED PSYCHOSIS NOT DUE TO A SUBSTANCE OR KNOWN PHYSIOLOGICAL CONDITION: ICD-10-CM

## 2020-01-20 DIAGNOSIS — Z98.890 OTHER SPECIFIED POSTPROCEDURAL STATES: Chronic | ICD-10-CM

## 2020-01-20 LAB
ALBUMIN SERPL ELPH-MCNC: 4 G/DL — SIGNIFICANT CHANGE UP (ref 3.3–5)
ALP SERPL-CCNC: 71 U/L — SIGNIFICANT CHANGE UP (ref 40–120)
ALT FLD-CCNC: 8 U/L — SIGNIFICANT CHANGE UP (ref 4–33)
AMPHET UR-MCNC: NEGATIVE — SIGNIFICANT CHANGE UP
ANION GAP SERPL CALC-SCNC: 14 MMO/L — SIGNIFICANT CHANGE UP (ref 7–14)
APAP SERPL-MCNC: < 15 UG/ML — LOW (ref 15–25)
APPEARANCE UR: SIGNIFICANT CHANGE UP
AST SERPL-CCNC: 19 U/L — SIGNIFICANT CHANGE UP (ref 4–32)
BARBITURATES UR SCN-MCNC: NEGATIVE — SIGNIFICANT CHANGE UP
BASOPHILS # BLD AUTO: 0.01 K/UL — SIGNIFICANT CHANGE UP (ref 0–0.2)
BASOPHILS NFR BLD AUTO: 0.3 % — SIGNIFICANT CHANGE UP (ref 0–2)
BENZODIAZ UR-MCNC: NEGATIVE — SIGNIFICANT CHANGE UP
BILIRUB SERPL-MCNC: < 0.2 MG/DL — LOW (ref 0.2–1.2)
BILIRUB UR-MCNC: NEGATIVE — SIGNIFICANT CHANGE UP
BLOOD UR QL VISUAL: NEGATIVE — SIGNIFICANT CHANGE UP
BUN SERPL-MCNC: 13 MG/DL — SIGNIFICANT CHANGE UP (ref 7–23)
CALCIUM SERPL-MCNC: 8.9 MG/DL — SIGNIFICANT CHANGE UP (ref 8.4–10.5)
CANNABINOIDS UR-MCNC: POSITIVE — SIGNIFICANT CHANGE UP
CHLORIDE SERPL-SCNC: 105 MMOL/L — SIGNIFICANT CHANGE UP (ref 98–107)
CO2 SERPL-SCNC: 22 MMOL/L — SIGNIFICANT CHANGE UP (ref 22–31)
COCAINE METAB.OTHER UR-MCNC: NEGATIVE — SIGNIFICANT CHANGE UP
COLOR SPEC: YELLOW — SIGNIFICANT CHANGE UP
CREAT SERPL-MCNC: 0.77 MG/DL — SIGNIFICANT CHANGE UP (ref 0.5–1.3)
EOSINOPHIL # BLD AUTO: 0.09 K/UL — SIGNIFICANT CHANGE UP (ref 0–0.5)
EOSINOPHIL NFR BLD AUTO: 2.7 % — SIGNIFICANT CHANGE UP (ref 0–6)
ETHANOL BLD-MCNC: 89 MG/DL — HIGH
GLUCOSE SERPL-MCNC: 81 MG/DL — SIGNIFICANT CHANGE UP (ref 70–99)
GLUCOSE UR-MCNC: NEGATIVE — SIGNIFICANT CHANGE UP
HCG SERPL-ACNC: < 5 MIU/ML — SIGNIFICANT CHANGE UP
HCT VFR BLD CALC: 31.7 % — LOW (ref 34.5–45)
HGB BLD-MCNC: 10.3 G/DL — LOW (ref 11.5–15.5)
IMM GRANULOCYTES NFR BLD AUTO: 0.3 % — SIGNIFICANT CHANGE UP (ref 0–1.5)
KETONES UR-MCNC: NEGATIVE — SIGNIFICANT CHANGE UP
LEUKOCYTE ESTERASE UR-ACNC: NEGATIVE — SIGNIFICANT CHANGE UP
LYMPHOCYTES # BLD AUTO: 0.87 K/UL — LOW (ref 1–3.3)
LYMPHOCYTES # BLD AUTO: 26.4 % — SIGNIFICANT CHANGE UP (ref 13–44)
MCHC RBC-ENTMCNC: 30.9 PG — SIGNIFICANT CHANGE UP (ref 27–34)
MCHC RBC-ENTMCNC: 32.5 % — SIGNIFICANT CHANGE UP (ref 32–36)
MCV RBC AUTO: 95.2 FL — SIGNIFICANT CHANGE UP (ref 80–100)
METHADONE UR-MCNC: NEGATIVE — SIGNIFICANT CHANGE UP
MONOCYTES # BLD AUTO: 0.33 K/UL — SIGNIFICANT CHANGE UP (ref 0–0.9)
MONOCYTES NFR BLD AUTO: 10 % — SIGNIFICANT CHANGE UP (ref 2–14)
NEUTROPHILS # BLD AUTO: 1.98 K/UL — SIGNIFICANT CHANGE UP (ref 1.8–7.4)
NEUTROPHILS NFR BLD AUTO: 60.3 % — SIGNIFICANT CHANGE UP (ref 43–77)
NITRITE UR-MCNC: NEGATIVE — SIGNIFICANT CHANGE UP
NRBC # FLD: 0 K/UL — SIGNIFICANT CHANGE UP (ref 0–0)
OPIATES UR-MCNC: NEGATIVE — SIGNIFICANT CHANGE UP
OXYCODONE UR-MCNC: NEGATIVE — SIGNIFICANT CHANGE UP
PCP UR-MCNC: NEGATIVE — SIGNIFICANT CHANGE UP
PH UR: 7.5 — SIGNIFICANT CHANGE UP (ref 5–8)
PLATELET # BLD AUTO: 249 K/UL — SIGNIFICANT CHANGE UP (ref 150–400)
PMV BLD: 11 FL — SIGNIFICANT CHANGE UP (ref 7–13)
POTASSIUM SERPL-MCNC: 3.9 MMOL/L — SIGNIFICANT CHANGE UP (ref 3.5–5.3)
POTASSIUM SERPL-SCNC: 3.9 MMOL/L — SIGNIFICANT CHANGE UP (ref 3.5–5.3)
PROT SERPL-MCNC: 6.9 G/DL — SIGNIFICANT CHANGE UP (ref 6–8.3)
PROT UR-MCNC: 50 — SIGNIFICANT CHANGE UP
RBC # BLD: 3.33 M/UL — LOW (ref 3.8–5.2)
RBC # FLD: 14 % — SIGNIFICANT CHANGE UP (ref 10.3–14.5)
SALICYLATES SERPL-MCNC: < 5 MG/DL — LOW (ref 15–30)
SODIUM SERPL-SCNC: 141 MMOL/L — SIGNIFICANT CHANGE UP (ref 135–145)
SP GR SPEC: 1.03 — SIGNIFICANT CHANGE UP (ref 1–1.04)
T3 SERPL-MCNC: 108.9 NG/DL — SIGNIFICANT CHANGE UP (ref 80–200)
T4 AB SER-ACNC: 6.62 UG/DL — SIGNIFICANT CHANGE UP (ref 5.1–13)
TSH SERPL-MCNC: 4.32 UIU/ML — HIGH (ref 0.27–4.2)
UROBILINOGEN FLD QL: NORMAL — SIGNIFICANT CHANGE UP
WBC # BLD: 3.29 K/UL — LOW (ref 3.8–10.5)
WBC # FLD AUTO: 3.29 K/UL — LOW (ref 3.8–10.5)

## 2020-01-20 PROCEDURE — 99285 EMERGENCY DEPT VISIT HI MDM: CPT

## 2020-01-20 RX ORDER — HALOPERIDOL DECANOATE 100 MG/ML
5 INJECTION INTRAMUSCULAR ONCE
Refills: 0 | Status: COMPLETED | OUTPATIENT
Start: 2020-01-20 | End: 2020-01-20

## 2020-01-20 RX ORDER — TRAZODONE HCL 50 MG
50 TABLET ORAL AT BEDTIME
Refills: 0 | Status: DISCONTINUED | OUTPATIENT
Start: 2020-01-20 | End: 2020-01-21

## 2020-01-20 RX ORDER — BICTEGRAVIR SODIUM, EMTRICITABINE, AND TENOFOVIR ALAFENAMIDE FUMARATE 30; 120; 15 MG/1; MG/1; MG/1
1 TABLET ORAL DAILY
Refills: 0 | Status: DISCONTINUED | OUTPATIENT
Start: 2020-01-20 | End: 2020-01-21

## 2020-01-20 RX ADMIN — BICTEGRAVIR SODIUM, EMTRICITABINE, AND TENOFOVIR ALAFENAMIDE FUMARATE 1 TABLET(S): 30; 120; 15 TABLET ORAL at 22:45

## 2020-01-20 RX ADMIN — HALOPERIDOL DECANOATE 5 MILLIGRAM(S): 100 INJECTION INTRAMUSCULAR at 06:19

## 2020-01-20 RX ADMIN — Medication 2 MILLIGRAM(S): at 06:19

## 2020-01-20 NOTE — ED PROVIDER NOTE - CLINICAL SUMMARY MEDICAL DECISION MAKING FREE TEXT BOX
Pt with reported psychosis in need of admission for further medication and care.    1030am- pt is currently calm and resting and awaiting breakfast- pt's behavior concern for potential harm to herself and others and needs further management and inpatient stabilization.

## 2020-01-20 NOTE — ED PROVIDER NOTE - PROGRESS NOTE DETAILS
pending psych eval by morning team. Pt pending a bed for admission.  Pt without any concerns except hungry- breakfast for pt was called for.  Awaiting inpatient psychiatric bed.

## 2020-01-20 NOTE — ED ADULT TRIAGE NOTE - CHIEF COMPLAINT QUOTE
Pt comes in from [facility] with c/o cutting her wrist and needing stiches. Pt has superficial lacerations to bilateral forearms, pt in no acute distress, vs as noted and pt going to  for further eval.

## 2020-01-20 NOTE — ED BEHAVIORAL HEALTH ASSESSMENT NOTE - OTHER
see HPI for details gaunt looking, multiple superficial cuts over the right forearm, left forearm superficial laceration; no active bleeding suspicious, evasive hold in  until service is able to find an in-Pt psych unit for her to be determined whether on 9.39 (if at [facility]) or 9.27 (if transferred)

## 2020-01-20 NOTE — ED BEHAVIORAL HEALTH ASSESSMENT NOTE - SUICIDE RISK FACTORS
Unable to engage in safety planning/History of abuse/trauma/Mood Disorder current/past/PTSD current/past/Psychotic disorder current/past/Recent onset of current/past psychiatric diagnosis/Chronic pain/Impulsivity

## 2020-01-20 NOTE — ED BEHAVIORAL HEALTH NOTE - BEHAVIORAL HEALTH NOTE
29/F with hx of bipolar disorder, prior psych admissions, not in OP psych treatment, no hx of SA but has SIB via cutting and hx of THC, alcohol and cocaine abuse.  On initial presentation, she was agitated, belligerent.  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   Pt was consequently medicated with Haldol 5mg IM and Ativan 2mg IM at 0619Hrs with good effect    Patient reassessed. She reports that she came to the hospital because she is "mentally sick". She states she is depressed because "my family is bullying me". She still reports thoughts to hurt herself (cutting) and refuses to safety plan or engage with writer. She  is still delusional (paranoid, referential, persecutory).  Patient currently awaiting an inpatient bed.  Given Pt's past psych hx, noncompliance to meds, unable to partake towards safety discharge, and affective dysregulation, she still cannot be safely discharged back to the community.  She will continue to need inpatient psychiatric admission for stabilization of mood/psychosis as well as ensuring her safety.

## 2020-01-20 NOTE — ED BEHAVIORAL HEALTH ASSESSMENT NOTE - DETAILS
hx  of cutting pt agitated in the ED when she first arrived requiring Pt to be medicated left forearm laceration to hand over per protocol AMBER Yañez Dr[address] Gaming

## 2020-01-20 NOTE — ED BEHAVIORAL HEALTH ASSESSMENT NOTE - SUICIDE PROTECTIVE FACTORS
Supportive social network of family or friends/Denominational beliefs/Beloved pets/Responsibility to family and others

## 2020-01-20 NOTE — ED BEHAVIORAL HEALTH NOTE - BEHAVIORAL HEALTH NOTE
SW met with patient to obtain collateral contact information. Patient refused to provide any contact phone numbers for collateral.  As per chart review, no collateral contacts have been identified in previous visits.
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
## 2020-01-20 NOTE — ED BEHAVIORAL HEALTH ASSESSMENT NOTE - SUMMARY
29/F with hx of bipolar disorder, prior psych admissions, not in OP psych treatment, no hx of SA but has SIB via cutting and hx of THC, alcohol and cocaine abuse.  On initial presentation, she was agitated, belligerent.  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   Pt was consequently medicated with Haldol 5mg IM and Ativan 2mg IM at 0619Hrs with good effect    At this time, Pt presents with affective dysregulation, is suspicious, uncooperative.  Pt is delusional (paranoid, referential, persecutory).  She is unable to partake towards safety discharge.  Pt has resorted to cutting "as she claims being bullied by her family".  There was elevated BAL level initially but this will not attribute solely to Pt's current clinical presentation.  The Pt is not delirious.  Given Pt's past psych hx, noncompliance to meds, unable to partake towards safety discharge, is delusional and continues to be affectively dysregulated, the Pt cannot be safely discharged back to the community.  She will need in-Pt psych admission for stabilization of mood/psychosis as well as ensuring safety.      RECOMMENDATIONS:   1. DEFER ANTIPSYCHOTIC REGIMENT TO MAIN TREATMENT TEAM ONCE SHE IS ADMITTED TO IN-PT UNIT.   2. PRN: HALDOL 5mg PO/IM + ATIVAN 2mg PO/IM q6hrs FOR AGITATION/SEVERE AGITATION  3. PRN: TRAZODONE 50mg HS PRN FOR SLEEP DISTURBANCES   4. ONCE MEDICALLY CLEARED, TO PURSUE ADMISSION on 9.39 STATUS or 9.27 STATUS (IF THERE ARE BEDS AVAILABLE AT OTHER IN-PT PSYCH UNITS   - AT THIS TIME, [facility] ARE FULL  5. ATTEMPT TO OBTAIN COLLATERAL INFORMATION  - ATTEMPTED TO CALL  - PHONE IS NOT ACCEPTING CALLS

## 2020-01-20 NOTE — ED ADULT NURSE NOTE - CHIEF COMPLAINT QUOTE
Pt comes in from [facility] with c/o cutting her wrist and needing stiches. Pt has superficial lacerations to bilateral forearms, pt in no acute distress, vs as noted and pt going to  for further eval.

## 2020-01-20 NOTE — ED BEHAVIORAL HEALTH ASSESSMENT NOTE - RISK ASSESSMENT
Risk factors include hx of psychiatric illness requiring several hospitalizations and medication management, remote hx of NSSIB, limited education and unemployment, chronic medical illness (HIV) and pain (back pain), hx of substance abuse, and what appears to be low frustration tolerance with related agitation at times. Protective factors include no hx of SA, no recent hospitalizations,  stable domicile, social supports + pets, religiousity, no access to a gun.     At this time, risk factors far outweigh protective factors towards mitigating Pt's risks for acute suicidality.  Pt is affectively dysregulated; is delusional.  Pt is unable to be safely discharged back to the community.  Pt will need in-Pt psych admission initiate meds for stabilization and to ensure safety High Acute Suicide Risk
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
## 2020-01-20 NOTE — ED BEHAVIORAL HEALTH ASSESSMENT NOTE - DESCRIPTION (FIRST USE, LAST USE, QUANTITY, FREQUENCY, DURATION)
denied alcoholism; but per chart review from her last ED visit, she claimed consuming "a very small amount of red wine in her tea each morning" denied THC abuse but per chart review, Pt "put a small amount of marijuana in tea each morning" denied using cocaine; but per chart review: Pt had hx of daily use, in early remission, has not used for a least several months
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
## 2020-01-21 DIAGNOSIS — F29 UNSPECIFIED PSYCHOSIS NOT DUE TO A SUBSTANCE OR KNOWN PHYSIOLOGICAL CONDITION: ICD-10-CM

## 2020-01-21 RX ORDER — ACETAMINOPHEN 500 MG
325 TABLET ORAL EVERY 6 HOURS
Refills: 0 | Status: DISCONTINUED | OUTPATIENT
Start: 2020-01-21 | End: 2020-01-31

## 2020-01-21 RX ORDER — BICTEGRAVIR SODIUM, EMTRICITABINE, AND TENOFOVIR ALAFENAMIDE FUMARATE 30; 120; 15 MG/1; MG/1; MG/1
1 TABLET ORAL DAILY
Refills: 0 | Status: DISCONTINUED | OUTPATIENT
Start: 2020-01-21 | End: 2020-01-22

## 2020-01-21 RX ORDER — IBUPROFEN 200 MG
400 TABLET ORAL EVERY 6 HOURS
Refills: 0 | Status: DISCONTINUED | OUTPATIENT
Start: 2020-01-21 | End: 2020-01-31

## 2020-01-21 RX ORDER — THIAMINE MONONITRATE (VIT B1) 100 MG
100 TABLET ORAL DAILY
Refills: 0 | Status: COMPLETED | OUTPATIENT
Start: 2020-01-21 | End: 2020-01-24

## 2020-01-21 RX ORDER — FOLIC ACID 0.8 MG
1 TABLET ORAL DAILY
Refills: 0 | Status: COMPLETED | OUTPATIENT
Start: 2020-01-21 | End: 2020-01-28

## 2020-01-21 RX ORDER — HALOPERIDOL DECANOATE 100 MG/ML
5 INJECTION INTRAMUSCULAR EVERY 6 HOURS
Refills: 0 | Status: DISCONTINUED | OUTPATIENT
Start: 2020-01-21 | End: 2020-01-31

## 2020-01-21 RX ORDER — LANOLIN ALCOHOL/MO/W.PET/CERES
5 CREAM (GRAM) TOPICAL AT BEDTIME
Refills: 0 | Status: DISCONTINUED | OUTPATIENT
Start: 2020-01-21 | End: 2020-01-31

## 2020-01-21 RX ADMIN — Medication 400 MILLIGRAM(S): at 18:20

## 2020-01-21 RX ADMIN — Medication 5 MILLIGRAM(S): at 23:07

## 2020-01-21 RX ADMIN — Medication 325 MILLIGRAM(S): at 14:08

## 2020-01-21 RX ADMIN — Medication 2 MILLIGRAM(S): at 23:07

## 2020-01-21 RX ADMIN — BICTEGRAVIR SODIUM, EMTRICITABINE, AND TENOFOVIR ALAFENAMIDE FUMARATE 1 TABLET(S): 30; 120; 15 TABLET ORAL at 18:20
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
## 2020-01-21 NOTE — ED ADULT NURSE REASSESSMENT NOTE - NS ED NURSE REASSESS COMMENT FT1
0830 Received report from night RN, pt lying on bed in nad eyes close breathing even & unlabored arousable safety & comfort measures maintained eval on going.
Evaluated and cleared by psychiatry, MC by MD for admission.  Pt denies s/i h/i/avh presently,  pt made aware of admission to [address] pt transported via security.
Patient sleeping in stretcher, briefly responds to voice, falls back asleep, unable to participate in appropriate assessment due to sedation. No acute distress. Respirations even and unlabored.
Pt arrives to [facility] accompanied by EMS in a highly psychotic state. Pt waving around "charm bags" which she states contain herbs to mc off evil spirits, pt chanting Synagogue statements and intermittently screaming. Pt not responding to multiple attempts at verbal redirection. Pt medicated as ordered.
@550, pt awakened, irritable and requesting to speak with a dr, states "I haven't spoken to a Dr since i've been here", despite having multiple evaluations with plan of care and wait time explained. Pt refusing Vitals at this time, NAD. Will continue to monitor for safety.

## 2020-01-21 NOTE — ED ADULT NURSE REASSESSMENT NOTE - GENERAL PATIENT STATE
received pt from RN break coverage, pt observed laying in bed, opens her eyes to verbal stimuli then returns back to sleep. NAD.

## 2020-01-22 RX ORDER — BENZOCAINE AND MENTHOL 5; 1 G/100ML; G/100ML
1 LIQUID ORAL THREE TIMES A DAY
Refills: 0 | Status: DISCONTINUED | OUTPATIENT
Start: 2020-01-22 | End: 2020-01-31

## 2020-01-22 RX ORDER — ARIPIPRAZOLE 15 MG/1
5 TABLET ORAL DAILY
Refills: 0 | Status: DISCONTINUED | OUTPATIENT
Start: 2020-01-23 | End: 2020-01-24

## 2020-01-22 RX ORDER — BICTEGRAVIR SODIUM, EMTRICITABINE, AND TENOFOVIR ALAFENAMIDE FUMARATE 30; 120; 15 MG/1; MG/1; MG/1
1 TABLET ORAL DAILY
Refills: 0 | Status: DISCONTINUED | OUTPATIENT
Start: 2020-01-22 | End: 2020-01-23

## 2020-01-22 RX ORDER — BACITRACIN ZINC 500 UNIT/G
1 OINTMENT IN PACKET (EA) TOPICAL ONCE
Refills: 0 | Status: COMPLETED | OUTPATIENT
Start: 2020-01-22 | End: 2020-01-22

## 2020-01-22 RX ORDER — FLUCONAZOLE 150 MG/1
400 TABLET ORAL DAILY
Refills: 0 | Status: DISCONTINUED | OUTPATIENT
Start: 2020-01-23 | End: 2020-01-31

## 2020-01-22 RX ADMIN — Medication 1 APPLICATION(S): at 00:01

## 2020-01-22 RX ADMIN — Medication 1 MILLIGRAM(S): at 10:41

## 2020-01-22 RX ADMIN — Medication 400 MILLIGRAM(S): at 13:25

## 2020-01-22 RX ADMIN — Medication 100 MILLIGRAM(S): at 10:41

## 2020-01-22 RX ADMIN — BENZOCAINE AND MENTHOL 1 LOZENGE: 5; 1 LIQUID ORAL at 23:20

## 2020-01-22 RX ADMIN — BICTEGRAVIR SODIUM, EMTRICITABINE, AND TENOFOVIR ALAFENAMIDE FUMARATE 1 TABLET(S): 30; 120; 15 TABLET ORAL at 18:37

## 2020-01-22 RX ADMIN — Medication 2 MILLIGRAM(S): at 20:46

## 2020-01-22 RX ADMIN — Medication 2 MILLIGRAM(S): at 14:31

## 2020-01-22 RX ADMIN — Medication 400 MILLIGRAM(S): at 21:22

## 2020-01-22 RX ADMIN — Medication 400 MILLIGRAM(S): at 20:46

## 2020-01-23 PROCEDURE — 99232 SBSQ HOSP IP/OBS MODERATE 35: CPT | Mod: GC

## 2020-01-23 PROCEDURE — 99223 1ST HOSP IP/OBS HIGH 75: CPT

## 2020-01-23 RX ORDER — LORATADINE 10 MG/1
10 TABLET ORAL ONCE
Refills: 0 | Status: COMPLETED | OUTPATIENT
Start: 2020-01-23 | End: 2020-01-23

## 2020-01-23 RX ORDER — BICTEGRAVIR SODIUM, EMTRICITABINE, AND TENOFOVIR ALAFENAMIDE FUMARATE 30; 120; 15 MG/1; MG/1; MG/1
1 TABLET ORAL
Refills: 0 | Status: DISCONTINUED | OUTPATIENT
Start: 2020-01-23 | End: 2020-01-31

## 2020-01-23 RX ORDER — DIPHENHYDRAMINE HCL 50 MG
50 CAPSULE ORAL ONCE
Refills: 0 | Status: DISCONTINUED | OUTPATIENT
Start: 2020-01-23 | End: 2020-01-31

## 2020-01-23 RX ORDER — DIPHENHYDRAMINE HCL 50 MG
50 CAPSULE ORAL EVERY 6 HOURS
Refills: 0 | Status: DISCONTINUED | OUTPATIENT
Start: 2020-01-23 | End: 2020-01-31

## 2020-01-23 RX ORDER — LORATADINE 10 MG/1
10 TABLET ORAL DAILY
Refills: 0 | Status: DISCONTINUED | OUTPATIENT
Start: 2020-01-23 | End: 2020-01-31

## 2020-01-23 RX ORDER — HALOPERIDOL DECANOATE 100 MG/ML
5 INJECTION INTRAMUSCULAR ONCE
Refills: 0 | Status: DISCONTINUED | OUTPATIENT
Start: 2020-01-23 | End: 2020-01-31

## 2020-01-23 RX ADMIN — Medication 2 MILLIGRAM(S): at 05:20

## 2020-01-23 RX ADMIN — LORATADINE 10 MILLIGRAM(S): 10 TABLET ORAL at 19:55

## 2020-01-23 RX ADMIN — Medication 100 MILLIGRAM(S): at 09:17

## 2020-01-23 RX ADMIN — Medication 2 MILLIGRAM(S): at 19:55

## 2020-01-23 RX ADMIN — BICTEGRAVIR SODIUM, EMTRICITABINE, AND TENOFOVIR ALAFENAMIDE FUMARATE 1 TABLET(S): 30; 120; 15 TABLET ORAL at 19:55

## 2020-01-23 RX ADMIN — Medication 5 MILLIGRAM(S): at 01:46

## 2020-01-23 RX ADMIN — Medication 5 MILLIGRAM(S): at 21:49

## 2020-01-23 RX ADMIN — ARIPIPRAZOLE 5 MILLIGRAM(S): 15 TABLET ORAL at 09:29

## 2020-01-23 RX ADMIN — BENZOCAINE AND MENTHOL 1 LOZENGE: 5; 1 LIQUID ORAL at 18:32

## 2020-01-23 RX ADMIN — Medication 400 MILLIGRAM(S): at 15:13

## 2020-01-23 RX ADMIN — Medication 1 MILLIGRAM(S): at 09:17

## 2020-01-23 RX ADMIN — Medication 325 MILLIGRAM(S): at 18:31

## 2020-01-23 RX ADMIN — BENZOCAINE AND MENTHOL 1 LOZENGE: 5; 1 LIQUID ORAL at 01:47

## 2020-01-23 RX ADMIN — Medication 400 MILLIGRAM(S): at 10:04

## 2020-01-23 RX ADMIN — Medication 2 MILLIGRAM(S): at 15:13

## 2020-01-23 RX ADMIN — Medication 600 MILLIGRAM(S): at 02:55

## 2020-01-23 NOTE — CONSULT NOTE ADULT - SUBJECTIVE AND OBJECTIVE BOX
29 F with PMHX of HIV/AIDS, bipolar disorder currently at [facility] asked to evaluate for medication management. pt is very confrontational and refuses to provide me any information about her hx or out-pt provider. she says she "refuses to sign any waivers and that it is HIPPA violation for me to ask her about her medical hx." I explained to her my capacity as her physician in the hospital and that I can facilitate her care in conjunction with her outpt ID physician to optimize her HIV regimen. Pt refuses to cooperative with hx of exam - pt also refused to provide info to psych team, per primary team pt also noted with cough. no reported fevers     Allergies: NKDA     PMHX: HIV  Social: prior cocaine abuser - current Marijuana user per chart   FHX: refused        ROS:  refused    MEDICATIONS  (STANDING):  ARIPiprazole 5 milliGRAM(s) Oral daily  bictegravir 50 mG/emtricitabine 200 mG/tenofovir alafenamide 25 mG (BIKTARVY) 1 Tablet(s) Oral daily  fluconAZOLE   Tablet 400 milliGRAM(s) Oral daily  folic acid 1 milliGRAM(s) Oral daily  thiamine 100 milliGRAM(s) Oral daily  trimethoprim  160 mG/sulfamethoxazole 800 mG 1 Tablet(s) Oral daily    MEDICATIONS  (PRN):  acetaminophen   Tablet .. 325 milliGRAM(s) Oral every 6 hours PRN Mild Pain (1 - 3), Moderate Pain (4 - 6)  benzocaine 15 mG/menthol 3.6 mG (Sugar-Free) Lozenge 1 Lozenge Oral three times a day PRN Sore throat  guaiFENesin  milliGRAM(s) Oral every 12 hours PRN Congestion  haloperidol     Tablet 5 milliGRAM(s) Oral every 6 hours PRN agitation  ibuprofen  Tablet. 400 milliGRAM(s) Oral every 6 hours PRN Mild Pain (1 - 3), Moderate Pain (4 - 6)  LORazepam     Tablet 2 milliGRAM(s) Oral every 6 hours PRN anxiety  LORazepam     Tablet 2 milliGRAM(s) Oral every 2 hours PRN CIWA score increase by 2 points and current CIWA score GREATER THAN 9  melatonin. 5 milliGRAM(s) Oral at bedtime PRN Insomnia      T(C): 36.8 (01-23-20 @ 09:20)  HR: 98 (01-23-20 @ 09:20)  BP: 122/70 (01-23-20 @ 09:20)  RR: --12  SpO2: --  CAPILLARY BLOOD GLUCOSE        I&O's Summary      PHYSICAL EXAM:  GENERAL: NAD, refused  LABS:            CD4 16            RADIOLOGY & ADDITIONAL TESTS:    Imaging Personally Reviewed:    Consultant(s) Notes Reviewed:      Care Discussed with Consultants/Other Providers:

## 2020-01-23 NOTE — CONSULT NOTE ADULT - ASSESSMENT
29 F with HIV/AIDS with reported cough     1- AIDS - suspect noncompliance - low CD4 - concern for resistance. pt is not providing info for out-pt ID physician to obtain collateral. rec ID input on whether we should cw regimen considerin inconsistency of compliance check cxr, cw ppx - at this time pt refusing - repeat CD4 and check VL     2- anemia - no overt bleed. check iron studies    3- psych - per primary team

## 2020-01-24 PROCEDURE — 99232 SBSQ HOSP IP/OBS MODERATE 35: CPT | Mod: GC

## 2020-01-24 PROCEDURE — 71045 X-RAY EXAM CHEST 1 VIEW: CPT | Mod: 26

## 2020-01-24 RX ORDER — ARIPIPRAZOLE 15 MG/1
5 TABLET ORAL ONCE
Refills: 0 | Status: COMPLETED | OUTPATIENT
Start: 2020-01-24 | End: 2020-01-24

## 2020-01-24 RX ORDER — ARIPIPRAZOLE 15 MG/1
10 TABLET ORAL DAILY
Refills: 0 | Status: DISCONTINUED | OUTPATIENT
Start: 2020-01-25 | End: 2020-01-25

## 2020-01-24 RX ORDER — CLONAZEPAM 1 MG
0.5 TABLET ORAL
Refills: 0 | Status: DISCONTINUED | OUTPATIENT
Start: 2020-01-24 | End: 2020-01-30

## 2020-01-24 RX ADMIN — Medication 2 MILLIGRAM(S): at 04:34

## 2020-01-24 RX ADMIN — Medication 5 MILLIGRAM(S): at 20:51

## 2020-01-24 RX ADMIN — Medication 1 MILLIGRAM(S): at 08:43

## 2020-01-24 RX ADMIN — BICTEGRAVIR SODIUM, EMTRICITABINE, AND TENOFOVIR ALAFENAMIDE FUMARATE 1 TABLET(S): 30; 120; 15 TABLET ORAL at 18:32

## 2020-01-24 RX ADMIN — Medication 600 MILLIGRAM(S): at 04:20

## 2020-01-24 RX ADMIN — Medication 1 TABLET(S): at 08:43

## 2020-01-24 RX ADMIN — ARIPIPRAZOLE 5 MILLIGRAM(S): 15 TABLET ORAL at 10:55

## 2020-01-24 RX ADMIN — Medication 2 MILLIGRAM(S): at 17:45

## 2020-01-24 RX ADMIN — Medication 0.5 MILLIGRAM(S): at 20:51

## 2020-01-24 RX ADMIN — FLUCONAZOLE 400 MILLIGRAM(S): 150 TABLET ORAL at 08:43

## 2020-01-24 RX ADMIN — BENZOCAINE AND MENTHOL 1 LOZENGE: 5; 1 LIQUID ORAL at 00:00

## 2020-01-24 RX ADMIN — ARIPIPRAZOLE 5 MILLIGRAM(S): 15 TABLET ORAL at 08:43

## 2020-01-24 RX ADMIN — HALOPERIDOL DECANOATE 5 MILLIGRAM(S): 100 INJECTION INTRAMUSCULAR at 18:32

## 2020-01-24 RX ADMIN — Medication 100 MILLIGRAM(S): at 08:43

## 2020-01-24 RX ADMIN — LORATADINE 10 MILLIGRAM(S): 10 TABLET ORAL at 08:43

## 2020-01-24 RX ADMIN — BENZOCAINE AND MENTHOL 1 LOZENGE: 5; 1 LIQUID ORAL at 04:12

## 2020-01-24 RX ADMIN — Medication 325 MILLIGRAM(S): at 18:35

## 2020-01-25 PROCEDURE — 99232 SBSQ HOSP IP/OBS MODERATE 35: CPT

## 2020-01-25 RX ORDER — ARIPIPRAZOLE 15 MG/1
12 TABLET ORAL DAILY
Refills: 0 | Status: DISCONTINUED | OUTPATIENT
Start: 2020-01-26 | End: 2020-01-27

## 2020-01-25 RX ADMIN — BENZOCAINE AND MENTHOL 1 LOZENGE: 5; 1 LIQUID ORAL at 01:13

## 2020-01-25 RX ADMIN — Medication 0.5 MILLIGRAM(S): at 09:05

## 2020-01-25 RX ADMIN — ARIPIPRAZOLE 10 MILLIGRAM(S): 15 TABLET ORAL at 09:05

## 2020-01-25 RX ADMIN — BENZOCAINE AND MENTHOL 1 LOZENGE: 5; 1 LIQUID ORAL at 10:55

## 2020-01-25 RX ADMIN — Medication 2 MILLIGRAM(S): at 10:48

## 2020-01-25 RX ADMIN — Medication 1 MILLIGRAM(S): at 09:06

## 2020-01-25 RX ADMIN — Medication 5 MILLIGRAM(S): at 19:57

## 2020-01-25 RX ADMIN — LORATADINE 10 MILLIGRAM(S): 10 TABLET ORAL at 09:06

## 2020-01-25 RX ADMIN — Medication 0.5 MILLIGRAM(S): at 19:57

## 2020-01-25 RX ADMIN — BICTEGRAVIR SODIUM, EMTRICITABINE, AND TENOFOVIR ALAFENAMIDE FUMARATE 1 TABLET(S): 30; 120; 15 TABLET ORAL at 18:44

## 2020-01-25 RX ADMIN — Medication 2 MILLIGRAM(S): at 13:33

## 2020-01-26 PROCEDURE — 99232 SBSQ HOSP IP/OBS MODERATE 35: CPT

## 2020-01-26 RX ORDER — CALCIUM CARBONATE 500(1250)
1 TABLET ORAL AT BEDTIME
Refills: 0 | Status: DISCONTINUED | OUTPATIENT
Start: 2020-01-26 | End: 2020-01-31

## 2020-01-26 RX ADMIN — Medication 325 MILLIGRAM(S): at 17:06

## 2020-01-26 RX ADMIN — Medication 2 MILLIGRAM(S): at 05:18

## 2020-01-26 RX ADMIN — Medication 2 MILLIGRAM(S): at 16:06

## 2020-01-26 RX ADMIN — HALOPERIDOL DECANOATE 5 MILLIGRAM(S): 100 INJECTION INTRAMUSCULAR at 17:11

## 2020-01-26 RX ADMIN — BICTEGRAVIR SODIUM, EMTRICITABINE, AND TENOFOVIR ALAFENAMIDE FUMARATE 1 TABLET(S): 30; 120; 15 TABLET ORAL at 19:05

## 2020-01-26 RX ADMIN — Medication 1 MILLIGRAM(S): at 09:34

## 2020-01-26 RX ADMIN — Medication 0.5 MILLIGRAM(S): at 20:56

## 2020-01-26 RX ADMIN — Medication 50 MILLIGRAM(S): at 20:56

## 2020-01-26 RX ADMIN — ARIPIPRAZOLE 12 MILLIGRAM(S): 15 TABLET ORAL at 09:34

## 2020-01-26 RX ADMIN — LORATADINE 10 MILLIGRAM(S): 10 TABLET ORAL at 09:34

## 2020-01-26 RX ADMIN — Medication 325 MILLIGRAM(S): at 16:04

## 2020-01-26 RX ADMIN — Medication 0.5 MILLIGRAM(S): at 09:34

## 2020-01-27 PROCEDURE — 99232 SBSQ HOSP IP/OBS MODERATE 35: CPT | Mod: GC

## 2020-01-27 RX ORDER — ARIPIPRAZOLE 15 MG/1
15 TABLET ORAL DAILY
Refills: 0 | Status: DISCONTINUED | OUTPATIENT
Start: 2020-01-28 | End: 2020-01-31

## 2020-01-27 RX ADMIN — Medication 1 MILLIGRAM(S): at 08:36

## 2020-01-27 RX ADMIN — Medication 0.5 MILLIGRAM(S): at 08:36

## 2020-01-27 RX ADMIN — BICTEGRAVIR SODIUM, EMTRICITABINE, AND TENOFOVIR ALAFENAMIDE FUMARATE 1 TABLET(S): 30; 120; 15 TABLET ORAL at 19:56

## 2020-01-27 RX ADMIN — Medication 2 MILLIGRAM(S): at 03:27

## 2020-01-27 RX ADMIN — ARIPIPRAZOLE 12 MILLIGRAM(S): 15 TABLET ORAL at 08:36

## 2020-01-27 RX ADMIN — Medication 0.5 MILLIGRAM(S): at 19:56

## 2020-01-27 RX ADMIN — Medication 325 MILLIGRAM(S): at 05:40

## 2020-01-27 RX ADMIN — LORATADINE 10 MILLIGRAM(S): 10 TABLET ORAL at 08:36

## 2020-01-27 RX ADMIN — Medication 50 MILLIGRAM(S): at 14:07

## 2020-01-27 RX ADMIN — Medication 5 MILLIGRAM(S): at 21:52

## 2020-01-28 PROCEDURE — 99233 SBSQ HOSP IP/OBS HIGH 50: CPT

## 2020-01-28 RX ORDER — ALBUTEROL 90 UG/1
2 AEROSOL, METERED ORAL EVERY 6 HOURS
Refills: 0 | Status: DISCONTINUED | OUTPATIENT
Start: 2020-01-28 | End: 2020-01-31

## 2020-01-28 RX ORDER — SENNA PLUS 8.6 MG/1
1 TABLET ORAL ONCE
Refills: 0 | Status: COMPLETED | OUTPATIENT
Start: 2020-01-28 | End: 2020-01-28

## 2020-01-28 RX ORDER — ATOVAQUONE 750 MG/5ML
1500 SUSPENSION ORAL DAILY
Refills: 0 | Status: DISCONTINUED | OUTPATIENT
Start: 2020-01-28 | End: 2020-01-31

## 2020-01-28 RX ORDER — CYCLOBENZAPRINE HYDROCHLORIDE 10 MG/1
5 TABLET, FILM COATED ORAL THREE TIMES A DAY
Refills: 0 | Status: DISCONTINUED | OUTPATIENT
Start: 2020-01-28 | End: 2020-01-31

## 2020-01-28 RX ADMIN — Medication 2 MILLIGRAM(S): at 01:44

## 2020-01-28 RX ADMIN — Medication 2 MILLIGRAM(S): at 13:13

## 2020-01-28 RX ADMIN — Medication 0.5 MILLIGRAM(S): at 08:13

## 2020-01-28 RX ADMIN — CYCLOBENZAPRINE HYDROCHLORIDE 5 MILLIGRAM(S): 10 TABLET, FILM COATED ORAL at 17:43

## 2020-01-28 RX ADMIN — Medication 400 MILLIGRAM(S): at 22:20

## 2020-01-28 RX ADMIN — BICTEGRAVIR SODIUM, EMTRICITABINE, AND TENOFOVIR ALAFENAMIDE FUMARATE 1 TABLET(S): 30; 120; 15 TABLET ORAL at 18:53

## 2020-01-28 RX ADMIN — Medication 325 MILLIGRAM(S): at 08:14

## 2020-01-28 RX ADMIN — Medication 50 MILLIGRAM(S): at 00:42

## 2020-01-28 RX ADMIN — Medication 400 MILLIGRAM(S): at 20:55

## 2020-01-28 RX ADMIN — Medication 1 MILLIGRAM(S): at 08:14

## 2020-01-28 RX ADMIN — ATOVAQUONE 1500 MILLIGRAM(S): 750 SUSPENSION ORAL at 17:39

## 2020-01-28 RX ADMIN — Medication 0.5 MILLIGRAM(S): at 20:54

## 2020-01-28 RX ADMIN — LORATADINE 10 MILLIGRAM(S): 10 TABLET ORAL at 08:14

## 2020-01-28 RX ADMIN — SENNA PLUS 1 TABLET(S): 8.6 TABLET ORAL at 22:51

## 2020-01-28 RX ADMIN — ALBUTEROL 2 PUFF(S): 90 AEROSOL, METERED ORAL at 17:44

## 2020-01-28 RX ADMIN — Medication 325 MILLIGRAM(S): at 09:38

## 2020-01-28 RX ADMIN — ALBUTEROL 2 PUFF(S): 90 AEROSOL, METERED ORAL at 22:25

## 2020-01-28 RX ADMIN — ARIPIPRAZOLE 15 MILLIGRAM(S): 15 TABLET ORAL at 08:13

## 2020-01-28 NOTE — PROGRESS NOTE ADULT - ASSESSMENT
29 F with HIV/AIDS, reported dyspnea, cough    Dyspnea: No wheeze at this time, with normal lung imagining.  May have mild viral URI, and may have asthma.  Would try albuterol inhaler prn.    AIDS: VL suppressed on current ART, low CD4, prophylaxis with bactrim and diflucan is recommended by her HIV doctors (unclear reason for diflucan, patient may have a history of invasive fungal infection such as candida esophagitis or cryptococcal meningitis).  Patient refusing these due to intolerance, an alternative PCP prophylaxis would be mepron 1500mg daily, offered and patient agrees to try.  Would discuss with ID whether azithromycin prophylaxis for MAC should be offered.  Continue current ART, quarterly CD4 measurement is recommended as long as pt remains adherent to ART.    Back pain: Longstanding, may benefit from physical therapy, cyclobenzaprine trial offered, patient will try.    Bipolar disorder: Management per primary team.
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
## 2020-01-29 RX ORDER — HYDROXYZINE HCL 10 MG
25 TABLET ORAL ONCE
Refills: 0 | Status: COMPLETED | OUTPATIENT
Start: 2020-01-29 | End: 2020-01-29

## 2020-01-29 RX ORDER — SENNA PLUS 8.6 MG/1
2 TABLET ORAL AT BEDTIME
Refills: 0 | Status: COMPLETED | OUTPATIENT
Start: 2020-01-29 | End: 2020-01-29

## 2020-01-29 RX ADMIN — ALBUTEROL 2 PUFF(S): 90 AEROSOL, METERED ORAL at 17:38

## 2020-01-29 RX ADMIN — ATOVAQUONE 1500 MILLIGRAM(S): 750 SUSPENSION ORAL at 08:46

## 2020-01-29 RX ADMIN — FLUCONAZOLE 400 MILLIGRAM(S): 150 TABLET ORAL at 08:49

## 2020-01-29 RX ADMIN — LORATADINE 10 MILLIGRAM(S): 10 TABLET ORAL at 08:44

## 2020-01-29 RX ADMIN — Medication 2 MILLIGRAM(S): at 12:01

## 2020-01-29 RX ADMIN — Medication 400 MILLIGRAM(S): at 17:39

## 2020-01-29 RX ADMIN — Medication 0.5 MILLIGRAM(S): at 21:59

## 2020-01-29 RX ADMIN — BICTEGRAVIR SODIUM, EMTRICITABINE, AND TENOFOVIR ALAFENAMIDE FUMARATE 1 TABLET(S): 30; 120; 15 TABLET ORAL at 18:17

## 2020-01-29 RX ADMIN — ARIPIPRAZOLE 15 MILLIGRAM(S): 15 TABLET ORAL at 08:45

## 2020-01-29 RX ADMIN — SENNA PLUS 2 TABLET(S): 8.6 TABLET ORAL at 18:17

## 2020-01-29 RX ADMIN — Medication 0.5 MILLIGRAM(S): at 08:44

## 2020-01-29 RX ADMIN — Medication 25 MILLIGRAM(S): at 18:17

## 2020-01-29 RX ADMIN — Medication 400 MILLIGRAM(S): at 22:00

## 2020-01-29 RX ADMIN — Medication 2 MILLIGRAM(S): at 02:09

## 2020-01-29 RX ADMIN — Medication 5 MILLIGRAM(S): at 21:59

## 2020-01-30 PROCEDURE — 99232 SBSQ HOSP IP/OBS MODERATE 35: CPT | Mod: GC

## 2020-01-30 PROCEDURE — 99232 SBSQ HOSP IP/OBS MODERATE 35: CPT

## 2020-01-30 RX ORDER — ARIPIPRAZOLE 15 MG/1
1 TABLET ORAL
Qty: 14 | Refills: 0
Start: 2020-01-30 | End: 2020-02-12

## 2020-01-30 RX ORDER — CLONAZEPAM 1 MG
0.5 TABLET ORAL AT BEDTIME
Refills: 0 | Status: DISCONTINUED | OUTPATIENT
Start: 2020-01-30 | End: 2020-01-31

## 2020-01-30 RX ORDER — ACETAMINOPHEN 500 MG
1 TABLET ORAL
Qty: 0 | Refills: 0 | DISCHARGE
Start: 2020-01-30

## 2020-01-30 RX ORDER — BICTEGRAVIR SODIUM, EMTRICITABINE, AND TENOFOVIR ALAFENAMIDE FUMARATE 30; 120; 15 MG/1; MG/1; MG/1
1 TABLET ORAL
Qty: 0 | Refills: 0 | DISCHARGE
Start: 2020-01-30

## 2020-01-30 RX ORDER — FLUCONAZOLE 150 MG/1
2 TABLET ORAL
Qty: 28 | Refills: 0
Start: 2020-01-30 | End: 2020-02-12

## 2020-01-30 RX ORDER — LORATADINE 10 MG/1
1 TABLET ORAL
Qty: 0 | Refills: 0 | DISCHARGE
Start: 2020-01-30

## 2020-01-30 RX ORDER — HYDROXYZINE HCL 10 MG
50 TABLET ORAL EVERY 6 HOURS
Refills: 0 | Status: DISCONTINUED | OUTPATIENT
Start: 2020-01-30 | End: 2020-01-31

## 2020-01-30 RX ORDER — CLONAZEPAM 1 MG
1 TABLET ORAL
Qty: 2 | Refills: 0
Start: 2020-01-30 | End: 2020-01-31

## 2020-01-30 RX ORDER — POLYETHYLENE GLYCOL 3350 17 G/17G
17 POWDER, FOR SOLUTION ORAL DAILY
Refills: 0 | Status: DISCONTINUED | OUTPATIENT
Start: 2020-01-30 | End: 2020-01-31

## 2020-01-30 RX ORDER — ATOVAQUONE 750 MG/5ML
10 SUSPENSION ORAL
Qty: 140 | Refills: 0
Start: 2020-01-30 | End: 2020-02-12

## 2020-01-30 RX ORDER — CLONAZEPAM 1 MG
0.5 TABLET ORAL
Refills: 0 | Status: DISCONTINUED | OUTPATIENT
Start: 2020-01-30 | End: 2020-01-30

## 2020-01-30 RX ORDER — HYDROXYZINE HCL 10 MG
1 TABLET ORAL
Qty: 28 | Refills: 0
Start: 2020-01-30

## 2020-01-30 RX ORDER — LANOLIN ALCOHOL/MO/W.PET/CERES
1 CREAM (GRAM) TOPICAL
Qty: 0 | Refills: 0 | DISCHARGE
Start: 2020-01-30

## 2020-01-30 RX ORDER — IBUPROFEN 200 MG
1 TABLET ORAL
Qty: 0 | Refills: 0 | DISCHARGE
Start: 2020-01-30

## 2020-01-30 RX ORDER — CALCIUM CARBONATE 500(1250)
1 TABLET ORAL
Qty: 0 | Refills: 0 | DISCHARGE
Start: 2020-01-30

## 2020-01-30 RX ORDER — ALBUTEROL 90 UG/1
2 AEROSOL, METERED ORAL
Qty: 1 | Refills: 0
Start: 2020-01-30

## 2020-01-30 RX ORDER — CYCLOBENZAPRINE HYDROCHLORIDE 10 MG/1
1 TABLET, FILM COATED ORAL
Qty: 14 | Refills: 0
Start: 2020-01-30

## 2020-01-30 RX ADMIN — Medication 50 MILLIGRAM(S): at 15:24

## 2020-01-30 RX ADMIN — ALBUTEROL 2 PUFF(S): 90 AEROSOL, METERED ORAL at 09:30

## 2020-01-30 RX ADMIN — ALBUTEROL 2 PUFF(S): 90 AEROSOL, METERED ORAL at 03:20

## 2020-01-30 RX ADMIN — FLUCONAZOLE 400 MILLIGRAM(S): 150 TABLET ORAL at 08:27

## 2020-01-30 RX ADMIN — BICTEGRAVIR SODIUM, EMTRICITABINE, AND TENOFOVIR ALAFENAMIDE FUMARATE 1 TABLET(S): 30; 120; 15 TABLET ORAL at 19:10

## 2020-01-30 RX ADMIN — Medication 0.5 MILLIGRAM(S): at 21:48

## 2020-01-30 RX ADMIN — CYCLOBENZAPRINE HYDROCHLORIDE 5 MILLIGRAM(S): 10 TABLET, FILM COATED ORAL at 04:00

## 2020-01-30 RX ADMIN — ATOVAQUONE 1500 MILLIGRAM(S): 750 SUSPENSION ORAL at 08:27

## 2020-01-30 RX ADMIN — Medication 50 MILLIGRAM(S): at 04:01

## 2020-01-30 RX ADMIN — POLYETHYLENE GLYCOL 3350 17 GRAM(S): 17 POWDER, FOR SOLUTION ORAL at 12:00

## 2020-01-30 RX ADMIN — ALBUTEROL 2 PUFF(S): 90 AEROSOL, METERED ORAL at 16:10

## 2020-01-30 RX ADMIN — Medication 2 MILLIGRAM(S): at 20:10

## 2020-01-30 RX ADMIN — Medication 0.5 MILLIGRAM(S): at 08:27

## 2020-01-30 RX ADMIN — LORATADINE 10 MILLIGRAM(S): 10 TABLET ORAL at 08:27

## 2020-01-30 RX ADMIN — ARIPIPRAZOLE 15 MILLIGRAM(S): 15 TABLET ORAL at 08:27

## 2020-01-30 RX ADMIN — CYCLOBENZAPRINE HYDROCHLORIDE 5 MILLIGRAM(S): 10 TABLET, FILM COATED ORAL at 16:10

## 2020-01-30 NOTE — PROGRESS NOTE ADULT - SUBJECTIVE AND OBJECTIVE BOX
CC/Reason for Consult: f/u pain, SOB    SUBJECTIVE / OVERNIGHT EVENTS:  Reports that she always has aches but cyclobenzaprine helps.  She is tolerating mepron well, but think sit is making her constipated.  Breathing sx improved with albuterol.    MEDICATIONS  (STANDING):  ARIPiprazole 15 milliGRAM(s) Oral daily  atovaquone Suspension 1500 milliGRAM(s) Oral daily  bictegravir 50 mG/emtricitabine 200 mG/tenofovir alafenamide 25 mG (BIKTARVY) 1 Tablet(s) Oral <User Schedule>  clonazePAM  Tablet 0.5 milliGRAM(s) Oral two times a day  fluconAZOLE   Tablet 400 milliGRAM(s) Oral daily  loratadine 10 milliGRAM(s) Oral daily    MEDICATIONS  (PRN):  acetaminophen   Tablet .. 325 milliGRAM(s) Oral every 6 hours PRN Mild Pain (1 - 3), Moderate Pain (4 - 6)  ALBUTerol    90 MICROgram(s) HFA Inhaler 2 Puff(s) Inhalation every 6 hours PRN Shortness of Breath and/or Wheezing  benzocaine 15 mG/menthol 3.6 mG (Sugar-Free) Lozenge 1 Lozenge Oral three times a day PRN Sore throat  calcium carbonate    500 mG (Tums) Chewable 1 Tablet(s) Chew at bedtime PRN heartburn  cyclobenzaprine 5 milliGRAM(s) Oral three times a day PRN Muscle Spasm  diphenhydrAMINE 50 milliGRAM(s) Oral every 6 hours PRN agitation/insomnia/EPS ppx  diphenhydrAMINE   Injectable 50 milliGRAM(s) IntraMuscular once PRN severe agitation/EPS ppx  guaiFENesin  milliGRAM(s) Oral every 12 hours PRN Congestion  haloperidol     Tablet 5 milliGRAM(s) Oral every 6 hours PRN agitation  haloperidol    Injectable 5 milliGRAM(s) IntraMuscular once PRN severe agitation  ibuprofen  Tablet. 400 milliGRAM(s) Oral every 6 hours PRN Mild Pain (1 - 3), Moderate Pain (4 - 6)  LORazepam     Tablet 2 milliGRAM(s) Oral every 6 hours PRN anxiety  LORazepam   Injectable 2 milliGRAM(s) IntraMuscular once PRN severe agitation/anxiety  melatonin. 5 milliGRAM(s) Oral at bedtime PRN Insomnia  polyethylene glycol 3350 17 Gram(s) Oral daily PRN constipation      Vital Signs Last 24 Hrs  T(C): 36.1 (29 Jan 2020 20:48), Max: 36.1 (29 Jan 2020 20:48)  T(F): 96.9 (29 Jan 2020 20:48), Max: 96.9 (29 Jan 2020 20:48)  HR: 95  BP: 139/78  BP(mean): --  RR: 15  SpO2: --  CAPILLARY BLOOD GLUCOSE            PHYSICAL EXAM:  GENERAL: NAD, thin  HEAD:  Atraumatic, Normocephalic  EYES: EOMI, conjunctiva and sclera clear  NECK: Supple, No JVD  CHEST/LUNG: Clear to auscultation bilaterally; No wheeze  HEART: Regular rate and rhythm; No murmurs, rubs, or gallops  ABDOMEN: Soft, Nontender, Nondistended; Bowel sounds present  EXTREMITIES:  2+ Peripheral Pulses, No clubbing, cyanosis, or edema  PSYCH: AAOx3  NEUROLOGY: non-focal  SKIN: No rashes or lesions        Care Discussed with Consultants/Other Providers: Dr Gaming

## 2020-01-30 NOTE — PROGRESS NOTE ADULT - ASSESSMENT
29 F with HIV/AIDS, reported dyspnea, cough    Dyspnea: No wheeze at this time, with normal lung imagining.  May have mild viral URI, and may have asthma.  Sx improved with albuterol inhaler prn.    AIDS: VL suppressed on current ART, low CD4, prophylaxis with bactrim and diflucan is recommended by her HIV doctors (diflucan probably for candidal esophagitis or sever thrush per pt).  Patient refusing these due to intolerance, but pt tolerating mepron, alternative PCP prophylaxis.  Discontinued bactrim. Would discuss with ID whether azithromycin prophylaxis for MAC should be offered.  Continue current ART, quarterly CD4 measurement is recommended as long as pt remains adherent to ART.    Back pain: Longstanding, may benefit from physical therapy, cyclobenzaprine prn helps.    Constipation: Miralax trial    Bipolar disorder: Management per primary team.

## 2020-01-31 VITALS — TEMPERATURE: 98 F | HEART RATE: 100 BPM | SYSTOLIC BLOOD PRESSURE: 111 MMHG | DIASTOLIC BLOOD PRESSURE: 77 MMHG

## 2020-01-31 PROCEDURE — 99239 HOSP IP/OBS DSCHRG MGMT >30: CPT | Mod: GC

## 2020-01-31 RX ADMIN — CYCLOBENZAPRINE HYDROCHLORIDE 5 MILLIGRAM(S): 10 TABLET, FILM COATED ORAL at 03:34

## 2020-01-31 RX ADMIN — ATOVAQUONE 1500 MILLIGRAM(S): 750 SUSPENSION ORAL at 09:14

## 2020-01-31 RX ADMIN — FLUCONAZOLE 400 MILLIGRAM(S): 150 TABLET ORAL at 09:14

## 2020-01-31 RX ADMIN — ARIPIPRAZOLE 15 MILLIGRAM(S): 15 TABLET ORAL at 09:14

## 2020-01-31 RX ADMIN — LORATADINE 10 MILLIGRAM(S): 10 TABLET ORAL at 09:15

## 2020-01-31 RX ADMIN — Medication 1 TABLET(S): at 06:56

## 2020-01-31 RX ADMIN — ALBUTEROL 2 PUFF(S): 90 AEROSOL, METERED ORAL at 02:50

## 2020-01-31 RX ADMIN — Medication 2 MILLIGRAM(S): at 11:18

## 2020-01-31 RX ADMIN — Medication 50 MILLIGRAM(S): at 03:32

## 2020-01-31 RX ADMIN — Medication 325 MILLIGRAM(S): at 06:56
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
## 2020-07-09 NOTE — ED PROVIDER NOTE - PATIENT PORTAL LINK FT
Nonweightbearing left knee for 2 days, then advance as tolerated.  Follow-up with Dr. Villanueva in 1 week.  Take Motrin or Tylenol as needed as directed for pain.   You can access the FollowMyHealth Patient Portal offered by [facility] by registering at the following website: [email or web address]. By joining [employer]’s FollowMyHealth portal, you will also be able to view your health information using other applications (apps) compatible with our system.

## 2020-11-01 PROCEDURE — G9005: CPT

## 2021-01-07 ENCOUNTER — EMERGENCY (EMERGENCY)
Facility: HOSPITAL | Age: 31
LOS: 1 days | Discharge: HOME | End: 2021-01-07
Attending: EMERGENCY MEDICINE
Payer: MEDICAID

## 2021-01-07 VITALS
RESPIRATION RATE: 17 BRPM | OXYGEN SATURATION: 100 % | HEIGHT: 64 IN | WEIGHT: 117.51 LBS | TEMPERATURE: 98 F | DIASTOLIC BLOOD PRESSURE: 76 MMHG | HEART RATE: 91 BPM | SYSTOLIC BLOOD PRESSURE: 127 MMHG

## 2021-01-07 DIAGNOSIS — Z98.890 OTHER SPECIFIED POSTPROCEDURAL STATES: Chronic | ICD-10-CM

## 2021-01-07 DIAGNOSIS — Z90.49 ACQUIRED ABSENCE OF OTHER SPECIFIED PARTS OF DIGESTIVE TRACT: Chronic | ICD-10-CM

## 2021-01-07 PROCEDURE — 99285 EMERGENCY DEPT VISIT HI MDM: CPT | Mod: 25

## 2021-01-07 RX ORDER — SODIUM CHLORIDE 9 MG/ML
1000 INJECTION INTRAMUSCULAR; INTRAVENOUS; SUBCUTANEOUS ONCE
Refills: 0 | Status: COMPLETED | OUTPATIENT
Start: 2021-01-07 | End: 2021-01-07

## 2021-01-07 RX ORDER — FAMOTIDINE 10 MG/ML
20 INJECTION INTRAVENOUS ONCE
Refills: 0 | Status: COMPLETED | OUTPATIENT
Start: 2021-01-07 | End: 2021-01-07

## 2021-01-07 RX ORDER — MORPHINE SULFATE 50 MG/1
2 CAPSULE, EXTENDED RELEASE ORAL ONCE
Refills: 0 | Status: DISCONTINUED | OUTPATIENT
Start: 2021-01-07 | End: 2021-01-07

## 2021-01-07 RX ORDER — ONDANSETRON 8 MG/1
4 TABLET, FILM COATED ORAL ONCE
Refills: 0 | Status: COMPLETED | OUTPATIENT
Start: 2021-01-07 | End: 2021-01-07

## 2021-01-07 NOTE — ED PROVIDER NOTE - PROGRESS NOTE DETAILS
pain still present despite morphine/GI cocktail. CT abdomen ordered and additional morphine ordered. labs and UA are unremarkable. ct abdomen with colitis. pain controlled. cipro/flagyl ordered. feels well. tolerating PO. symptoms likely 2/2 colitis. will dc with abx. f/u with GI. also advised to f/u with ID to restart HIV medications- pt states she plans on doing this. return precautions discussed.

## 2021-01-07 NOTE — ED PROVIDER NOTE - OBJECTIVE STATEMENT
30 year old female PMh AIDS (no currently on meds 2/2 drug use and states thinks last T cell 150-200 and not on prophylactic meds) coming in with 1.5 weeks of periumbilical and RLQ abd pain that haven't resolved. states nothing makes it better or worse. took pepto bismol without improvement. never had pains like this before. denies N/v/D/C, urinary complaints, fevers, chills, sweats, back pains, cp, sob, palpitations, cough. s/p appendectomy and inguinal hernia repair. also states feels bloated and had decreased appetite.

## 2021-01-07 NOTE — ED PROVIDER NOTE - PATIENT PORTAL LINK FT
You can access the FollowMyHealth Patient Portal offered by [facility] by registering at the following website: [email or web address]. By joining [employer]’s FollowMyHealth portal, you will also be able to view your health information using other applications (apps) compatible with our system.

## 2021-01-07 NOTE — ED PROVIDER NOTE - CLINICAL SUMMARY MEDICAL DECISION MAKING FREE TEXT BOX
30 year old female with 1.5 weeks abd pain and bloating. vitals WNL. Pe as above.  labs, ua, pain control, gi cocktail, +/- CT abdomen, reassess

## 2021-01-07 NOTE — ED PROVIDER NOTE - NSFOLLOWUPINSTRUCTIONS_ED_ALL_ED_FT
Log Out.      [employer] CareNotes®     :  [facility]  	                       COLITIS - AfterCare(R) Instructions(ER/ED)           Colitis    WHAT YOU NEED TO KNOW:    Colitis is swelling and irritation of your colon. Colitis may be caused by ulcers or a problem with your immune system. Bacteria, a virus, or a parasite may also cause colitis. The cause may not be known. You may have diarrhea, abdominal pain, fever, or blood or mucus in your bowel movement.    DISCHARGE INSTRUCTIONS:    Return to the emergency department if:   •You have sudden trouble breathing.      •Your bowel movements are black or have blood in them.      •You have blood in your vomit.      •You have severe abdominal pain or your abdomen is swollen and feels hard.      •You have any of the following signs of dehydration: ?Dizziness or weakness      ?Dry mouth, cracked lips, or severe thirst      ?Fast heartbeat or breathing      ?Urinating very little or not at all        Call your doctor if:   •Your symptoms get worse or do not go away.      •You have a fever, chills, cough, or feel weak and achy.      •You suddenly lose weight without trying.      •You have questions or concerns about your condition or care.      Medicines:   •Medicines may be given to decrease inflammation in your colon and treat diarrhea.      •Take your medicine as directed. Contact your healthcare provider if you think your medicine is not helping or if you have side effects. Tell him of her if you are allergic to any medicine. Keep a list of the medicines, vitamins, and herbs you take. Include the amounts, and when and why you take them. Bring the list or the pill bottles to follow-up visits. Carry your medicine list with you in case of an emergency.      Manage your symptoms:   •Drink liquids as directed to help prevent dehydration. Good liquids to drink include water, juice, and broth. Ask how much liquid to drink each day. You may need to drink an oral rehydration solution (ORS). An ORS contains a balance of water, salt, and sugar to replace body fluids lost during diarrhea.      •Eat a variety of healthy foods. Healthy foods include fruits, vegetables, whole-grain breads, beans, low-fat dairy products, lean meats, and fish. You may need to eat several small meals throughout the day instead of large meals. Avoid spicy foods, caffeine, chocolate, and foods high in fat.      •Talk to your healthcare provider before you take NSAIDs. NSAIDs can cause worsen your symptoms if ulcers are causing your colitis.      •Start to exercise when you feel better. Regular exercise helps your bowels work normally. Ask about the best exercise plan for you.      Prevent the spread of germs:          •Wash your hands often. Wash your hands several times each day. Wash after you use the bathroom, change a child's diaper, and before you prepare or eat food. Use soap and water every time. Rub your soapy hands together, lacing your fingers. Wash the front and back of your hands, and in between your fingers. Use the fingers of one hand to scrub under the fingernails of the other hand. Wash for at least 20 seconds. Rinse with warm, running water for several seconds. Then dry your hands with a clean towel or paper towel. Use hand  that contains alcohol if soap and water are not available. Do not touch your eyes, nose, or mouth without washing your hands first.  Handwashing           •Cover a sneeze or cough. Use a tissue that covers your mouth and nose. Throw the tissue away in a trash can right away. Use the bend of your arm if a tissue is not available. Wash your hands well with soap and water or use a hand .      •Clean surfaces often. Clean doorknobs, countertops, cell phones, and other surfaces that are touched often. Use a disinfecting wipe, a single-use sponge, or a cloth you can wash and reuse. Use disinfecting  if you do not have wipes. You can create a disinfecting  by mixing 1 part bleach with 10 parts water.      •Ask about vaccines you may need. Vaccines help prevent disease caused by some viruses and bacteria. Get the influenza (flu) vaccine as soon as recommended each year. The flu vaccine is usually available starting in September or October. Flu viruses change, so it is important to get a flu vaccine every year. Get the pneumonia vaccine if recommended. This vaccine is usually recommended every 5 years. Your provider will tell you when to get this vaccine, if needed. Your healthcare provider can tell you if you should get other vaccines, and when to get them.      Follow up with your doctor as directed: You may need to return for a colonoscopy or other tests. Write down how often you have a bowel movements and what they look like. Bring this to your follow-up visits. Write down your questions so you remember to ask them during your visits.       © Copyright [employer] 2021           back to top                          © Copyright [employer] 2021

## 2021-01-07 NOTE — ED PROVIDER NOTE - NSFOLLOWUPCLINICS_GEN_ALL_ED_FT
[facility]  Gastroenterology  [address] [place] [address]  Phone: [phone]  Fax: [phone]  Follow Up Time:

## 2021-01-08 VITALS
DIASTOLIC BLOOD PRESSURE: 74 MMHG | OXYGEN SATURATION: 100 % | RESPIRATION RATE: 18 BRPM | TEMPERATURE: 98 F | SYSTOLIC BLOOD PRESSURE: 119 MMHG | HEART RATE: 69 BPM

## 2021-01-08 LAB
ALBUMIN SERPL ELPH-MCNC: 3.9 G/DL — SIGNIFICANT CHANGE UP (ref 3.5–5)
ALP SERPL-CCNC: 114 U/L — SIGNIFICANT CHANGE UP (ref 40–120)
ALT FLD-CCNC: 19 U/L DA — SIGNIFICANT CHANGE UP (ref 10–60)
ANION GAP SERPL CALC-SCNC: 10 MMOL/L — SIGNIFICANT CHANGE UP (ref 5–17)
APPEARANCE UR: CLEAR — SIGNIFICANT CHANGE UP
APTT BLD: 33.3 SEC — SIGNIFICANT CHANGE UP (ref 27.5–35.5)
AST SERPL-CCNC: 14 U/L — SIGNIFICANT CHANGE UP (ref 10–40)
BASOPHILS # BLD AUTO: 0.03 K/UL — SIGNIFICANT CHANGE UP (ref 0–0.2)
BASOPHILS NFR BLD AUTO: 0.4 % — SIGNIFICANT CHANGE UP (ref 0–2)
BILIRUB DIRECT SERPL-MCNC: <0.1 MG/DL — SIGNIFICANT CHANGE UP (ref 0–0.2)
BILIRUB INDIRECT FLD-MCNC: >0.2 MG/DL — SIGNIFICANT CHANGE UP (ref 0.2–1)
BILIRUB SERPL-MCNC: 0.3 MG/DL — SIGNIFICANT CHANGE UP (ref 0.2–1.2)
BILIRUB SERPL-MCNC: 0.3 MG/DL — SIGNIFICANT CHANGE UP (ref 0.2–1.2)
BILIRUB UR-MCNC: NEGATIVE — SIGNIFICANT CHANGE UP
BUN SERPL-MCNC: 12 MG/DL — SIGNIFICANT CHANGE UP (ref 7–18)
CALCIUM SERPL-MCNC: 9.6 MG/DL — SIGNIFICANT CHANGE UP (ref 8.4–10.5)
CHLORIDE SERPL-SCNC: 103 MMOL/L — SIGNIFICANT CHANGE UP (ref 96–108)
CO2 SERPL-SCNC: 26 MMOL/L — SIGNIFICANT CHANGE UP (ref 22–31)
COLOR SPEC: YELLOW — SIGNIFICANT CHANGE UP
CREAT SERPL-MCNC: 0.78 MG/DL — SIGNIFICANT CHANGE UP (ref 0.5–1.3)
DIFF PNL FLD: NEGATIVE — SIGNIFICANT CHANGE UP
EOSINOPHIL # BLD AUTO: 0.03 K/UL — SIGNIFICANT CHANGE UP (ref 0–0.5)
EOSINOPHIL NFR BLD AUTO: 0.4 % — SIGNIFICANT CHANGE UP (ref 0–6)
GLUCOSE SERPL-MCNC: 79 MG/DL — SIGNIFICANT CHANGE UP (ref 70–99)
GLUCOSE UR QL: NEGATIVE — SIGNIFICANT CHANGE UP
HCG SERPL-ACNC: <1 MIU/ML — SIGNIFICANT CHANGE UP
HCG UR QL: NEGATIVE — SIGNIFICANT CHANGE UP
HCT VFR BLD CALC: 37.7 % — SIGNIFICANT CHANGE UP (ref 34.5–45)
HGB BLD-MCNC: 12.4 G/DL — SIGNIFICANT CHANGE UP (ref 11.5–15.5)
IMM GRANULOCYTES NFR BLD AUTO: 0.2 % — SIGNIFICANT CHANGE UP (ref 0–1.5)
INR BLD: 0.97 RATIO — SIGNIFICANT CHANGE UP (ref 0.88–1.16)
KETONES UR-MCNC: NEGATIVE — SIGNIFICANT CHANGE UP
LACTATE SERPL-SCNC: 0.9 MMOL/L — SIGNIFICANT CHANGE UP (ref 0.7–2)
LEUKOCYTE ESTERASE UR-ACNC: NEGATIVE — SIGNIFICANT CHANGE UP
LIDOCAIN IGE QN: 118 U/L — SIGNIFICANT CHANGE UP (ref 73–393)
LYMPHOCYTES # BLD AUTO: 1.28 K/UL — SIGNIFICANT CHANGE UP (ref 1–3.3)
LYMPHOCYTES # BLD AUTO: 16 % — SIGNIFICANT CHANGE UP (ref 13–44)
MCHC RBC-ENTMCNC: 31.2 PG — SIGNIFICANT CHANGE UP (ref 27–34)
MCHC RBC-ENTMCNC: 32.9 GM/DL — SIGNIFICANT CHANGE UP (ref 32–36)
MCV RBC AUTO: 94.7 FL — SIGNIFICANT CHANGE UP (ref 80–100)
MONOCYTES # BLD AUTO: 0.67 K/UL — SIGNIFICANT CHANGE UP (ref 0–0.9)
MONOCYTES NFR BLD AUTO: 8.4 % — SIGNIFICANT CHANGE UP (ref 2–14)
NEUTROPHILS # BLD AUTO: 5.98 K/UL — SIGNIFICANT CHANGE UP (ref 1.8–7.4)
NEUTROPHILS NFR BLD AUTO: 74.6 % — SIGNIFICANT CHANGE UP (ref 43–77)
NITRITE UR-MCNC: NEGATIVE — SIGNIFICANT CHANGE UP
NRBC # BLD: 0 /100 WBCS — SIGNIFICANT CHANGE UP (ref 0–0)
PH UR: 7 — SIGNIFICANT CHANGE UP (ref 5–8)
PLATELET # BLD AUTO: 315 K/UL — SIGNIFICANT CHANGE UP (ref 150–400)
POTASSIUM SERPL-MCNC: 3.5 MMOL/L — SIGNIFICANT CHANGE UP (ref 3.5–5.3)
POTASSIUM SERPL-SCNC: 3.5 MMOL/L — SIGNIFICANT CHANGE UP (ref 3.5–5.3)
PROT SERPL-MCNC: 8.7 G/DL — HIGH (ref 6–8.3)
PROT UR-MCNC: NEGATIVE — SIGNIFICANT CHANGE UP
PROTHROM AB SERPL-ACNC: 11.6 SEC — SIGNIFICANT CHANGE UP (ref 10.6–13.6)
RBC # BLD: 3.98 M/UL — SIGNIFICANT CHANGE UP (ref 3.8–5.2)
RBC # FLD: 12.4 % — SIGNIFICANT CHANGE UP (ref 10.3–14.5)
SODIUM SERPL-SCNC: 139 MMOL/L — SIGNIFICANT CHANGE UP (ref 135–145)
SP GR SPEC: 1.01 — SIGNIFICANT CHANGE UP (ref 1.01–1.02)
UROBILINOGEN FLD QL: NEGATIVE — SIGNIFICANT CHANGE UP
WBC # BLD: 8.01 K/UL — SIGNIFICANT CHANGE UP (ref 3.8–10.5)
WBC # FLD AUTO: 8.01 K/UL — SIGNIFICANT CHANGE UP (ref 3.8–10.5)

## 2021-01-08 PROCEDURE — 74177 CT ABD & PELVIS W/CONTRAST: CPT

## 2021-01-08 PROCEDURE — 82248 BILIRUBIN DIRECT: CPT

## 2021-01-08 PROCEDURE — 99284 EMERGENCY DEPT VISIT MOD MDM: CPT | Mod: 25

## 2021-01-08 PROCEDURE — 83605 ASSAY OF LACTIC ACID: CPT

## 2021-01-08 PROCEDURE — 82247 BILIRUBIN TOTAL: CPT

## 2021-01-08 PROCEDURE — 96361 HYDRATE IV INFUSION ADD-ON: CPT

## 2021-01-08 PROCEDURE — 96368 THER/DIAG CONCURRENT INF: CPT

## 2021-01-08 PROCEDURE — 96365 THER/PROPH/DIAG IV INF INIT: CPT | Mod: XU

## 2021-01-08 PROCEDURE — 85730 THROMBOPLASTIN TIME PARTIAL: CPT

## 2021-01-08 PROCEDURE — 83690 ASSAY OF LIPASE: CPT

## 2021-01-08 PROCEDURE — 96376 TX/PRO/DX INJ SAME DRUG ADON: CPT

## 2021-01-08 PROCEDURE — 86901 BLOOD TYPING SEROLOGIC RH(D): CPT

## 2021-01-08 PROCEDURE — 86900 BLOOD TYPING SEROLOGIC ABO: CPT

## 2021-01-08 PROCEDURE — 80053 COMPREHEN METABOLIC PANEL: CPT

## 2021-01-08 PROCEDURE — 87086 URINE CULTURE/COLONY COUNT: CPT

## 2021-01-08 PROCEDURE — 81003 URINALYSIS AUTO W/O SCOPE: CPT

## 2021-01-08 PROCEDURE — 86850 RBC ANTIBODY SCREEN: CPT

## 2021-01-08 PROCEDURE — 85025 COMPLETE CBC W/AUTO DIFF WBC: CPT

## 2021-01-08 PROCEDURE — 74177 CT ABD & PELVIS W/CONTRAST: CPT | Mod: 26

## 2021-01-08 PROCEDURE — 96375 TX/PRO/DX INJ NEW DRUG ADDON: CPT

## 2021-01-08 PROCEDURE — 36415 COLL VENOUS BLD VENIPUNCTURE: CPT

## 2021-01-08 PROCEDURE — 84702 CHORIONIC GONADOTROPIN TEST: CPT

## 2021-01-08 PROCEDURE — 85610 PROTHROMBIN TIME: CPT

## 2021-01-08 PROCEDURE — 81025 URINE PREGNANCY TEST: CPT

## 2021-01-08 RX ORDER — METRONIDAZOLE 500 MG
1 TABLET ORAL
Qty: 30 | Refills: 0
Start: 2021-01-08 | End: 2021-01-17

## 2021-01-08 RX ORDER — CIPROFLOXACIN LACTATE 400MG/40ML
400 VIAL (ML) INTRAVENOUS ONCE
Refills: 0 | Status: COMPLETED | OUTPATIENT
Start: 2021-01-08 | End: 2021-01-08

## 2021-01-08 RX ORDER — ACETAMINOPHEN 500 MG
2 TABLET ORAL
Qty: 84 | Refills: 0
Start: 2021-01-08 | End: 2021-01-14

## 2021-01-08 RX ORDER — CIPROFLOXACIN LACTATE 400MG/40ML
1 VIAL (ML) INTRAVENOUS
Qty: 20 | Refills: 0
Start: 2021-01-08 | End: 2021-01-17

## 2021-01-08 RX ORDER — METRONIDAZOLE 500 MG
500 TABLET ORAL ONCE
Refills: 0 | Status: COMPLETED | OUTPATIENT
Start: 2021-01-08 | End: 2021-01-08

## 2021-01-08 RX ORDER — MORPHINE SULFATE 50 MG/1
4 CAPSULE, EXTENDED RELEASE ORAL ONCE
Refills: 0 | Status: DISCONTINUED | OUTPATIENT
Start: 2021-01-08 | End: 2021-01-08

## 2021-01-08 RX ADMIN — SODIUM CHLORIDE 1000 MILLILITER(S): 9 INJECTION INTRAMUSCULAR; INTRAVENOUS; SUBCUTANEOUS at 01:31

## 2021-01-08 RX ADMIN — Medication 30 MILLILITER(S): at 00:30

## 2021-01-08 RX ADMIN — ONDANSETRON 4 MILLIGRAM(S): 8 TABLET, FILM COATED ORAL at 00:30

## 2021-01-08 RX ADMIN — MORPHINE SULFATE 4 MILLIGRAM(S): 50 CAPSULE, EXTENDED RELEASE ORAL at 01:13

## 2021-01-08 RX ADMIN — MORPHINE SULFATE 2 MILLIGRAM(S): 50 CAPSULE, EXTENDED RELEASE ORAL at 00:30

## 2021-01-08 RX ADMIN — Medication 100 MILLIGRAM(S): at 03:32

## 2021-01-08 RX ADMIN — SODIUM CHLORIDE 1000 MILLILITER(S): 9 INJECTION INTRAMUSCULAR; INTRAVENOUS; SUBCUTANEOUS at 00:30

## 2021-01-08 RX ADMIN — Medication 200 MILLIGRAM(S): at 03:32

## 2021-01-08 RX ADMIN — Medication 500 MILLIGRAM(S): at 04:10

## 2021-01-08 RX ADMIN — Medication 400 MILLIGRAM(S): at 04:00

## 2021-01-08 RX ADMIN — FAMOTIDINE 20 MILLIGRAM(S): 10 INJECTION INTRAVENOUS at 00:30

## 2021-01-08 NOTE — ED ADULT NURSE NOTE - OBJECTIVE STATEMENT
30 yr old pt complaining of    abdominal  pain and nausea for 2 weeks. pt aaox4  ambulating . iv started in rt AC # 20 angiocath good blood return ,iv fluid started . blood  sent for lab . morphine 2 mg iv given with no releif   DR. STEVENS made aware

## 2021-01-08 NOTE — ED ADULT NURSE REASSESSMENT NOTE - NS ED NURSE REASSESS COMMENT FT1
1 am morphine 2mg given with no pain relief .MD made aware .morphine 4 mg iv given with good effect . pt states that she takes MARIJUANA very often for pain releif

## 2021-01-08 NOTE — ED ADULT NURSE REASSESSMENT NOTE - NS ED NURSE REASSESS COMMENT FT1
4 am . iv discontinued completely . discharge teaching reinforced . medications side effects ,action  everything explained . .pt get agitated at times . both arms noted with knife cutting markes./ Dr. howell made aware . pt was informed to follow up with gastro and psych and information given . pt is very receptive at the end . sandwich and juice given as per her request   .vss ,sent her home in stable condition

## 2021-01-08 NOTE — ED ADULT NURSE NOTE - NSIMPLEMENTINTERV_GEN_ALL_ED
Implemented All Universal Safety Interventions:  [place] to call system. Call bell, personal items and telephone within reach. Instruct patient to call for assistance. Room bathroom lighting operational. Non-slip footwear when patient is off stretcher. Physically safe environment: no spills, clutter or unnecessary equipment. Stretcher in lowest position, wheels locked, appropriate side rails in place.

## 2021-01-08 NOTE — ED ADULT NURSE REASSESSMENT NOTE - NS ED NURSE REASSESS COMMENT FT1
150 am . pt ambulated to bath room. urine sent for UA and Upreg and urine c/s. pt sleeping now . no distress

## 2021-01-09 LAB
CULTURE RESULTS: SIGNIFICANT CHANGE UP
SPECIMEN SOURCE: SIGNIFICANT CHANGE UP
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
## 2022-06-01 ENCOUNTER — EMERGENCY (EMERGENCY)
Facility: HOSPITAL | Age: 32
LOS: 1 days | Discharge: HOME | End: 2022-06-01
Attending: EMERGENCY MEDICINE | Admitting: EMERGENCY MEDICINE
Payer: MEDICAID

## 2022-06-01 VITALS
OXYGEN SATURATION: 100 % | DIASTOLIC BLOOD PRESSURE: 74 MMHG | HEART RATE: 82 BPM | HEIGHT: 64 IN | RESPIRATION RATE: 18 BRPM | TEMPERATURE: 98 F | SYSTOLIC BLOOD PRESSURE: 126 MMHG

## 2022-06-01 DIAGNOSIS — Z90.49 ACQUIRED ABSENCE OF OTHER SPECIFIED PARTS OF DIGESTIVE TRACT: Chronic | ICD-10-CM

## 2022-06-01 DIAGNOSIS — Z98.890 OTHER SPECIFIED POSTPROCEDURAL STATES: Chronic | ICD-10-CM

## 2022-06-01 LAB
ALBUMIN SERPL ELPH-MCNC: 4.3 G/DL — SIGNIFICANT CHANGE UP (ref 3.3–5)
ALP SERPL-CCNC: 72 U/L — SIGNIFICANT CHANGE UP (ref 40–120)
ALT FLD-CCNC: 9 U/L — SIGNIFICANT CHANGE UP (ref 4–33)
ANION GAP SERPL CALC-SCNC: 12 MMOL/L — SIGNIFICANT CHANGE UP (ref 7–14)
APPEARANCE UR: CLEAR — SIGNIFICANT CHANGE UP
AST SERPL-CCNC: 17 U/L — SIGNIFICANT CHANGE UP (ref 4–32)
BASOPHILS # BLD AUTO: 0.02 K/UL — SIGNIFICANT CHANGE UP (ref 0–0.2)
BASOPHILS NFR BLD AUTO: 0.2 % — SIGNIFICANT CHANGE UP (ref 0–2)
BILIRUB SERPL-MCNC: <0.2 MG/DL — SIGNIFICANT CHANGE UP (ref 0.2–1.2)
BILIRUB UR-MCNC: NEGATIVE — SIGNIFICANT CHANGE UP
BUN SERPL-MCNC: 8 MG/DL — SIGNIFICANT CHANGE UP (ref 7–23)
CALCIUM SERPL-MCNC: 9.7 MG/DL — SIGNIFICANT CHANGE UP (ref 8.4–10.5)
CHLORIDE SERPL-SCNC: 103 MMOL/L — SIGNIFICANT CHANGE UP (ref 98–107)
CO2 SERPL-SCNC: 20 MMOL/L — LOW (ref 22–31)
COLOR SPEC: SIGNIFICANT CHANGE UP
CREAT SERPL-MCNC: 0.64 MG/DL — SIGNIFICANT CHANGE UP (ref 0.5–1.3)
DIFF PNL FLD: NEGATIVE — SIGNIFICANT CHANGE UP
EGFR: 122 ML/MIN/1.73M2 — SIGNIFICANT CHANGE UP
EOSINOPHIL # BLD AUTO: 0.06 K/UL — SIGNIFICANT CHANGE UP (ref 0–0.5)
EOSINOPHIL NFR BLD AUTO: 0.6 % — SIGNIFICANT CHANGE UP (ref 0–6)
GLUCOSE SERPL-MCNC: 98 MG/DL — SIGNIFICANT CHANGE UP (ref 70–99)
GLUCOSE UR QL: NEGATIVE — SIGNIFICANT CHANGE UP
HCG SERPL-ACNC: 8927 MIU/ML — SIGNIFICANT CHANGE UP
HCT VFR BLD CALC: 35.8 % — SIGNIFICANT CHANGE UP (ref 34.5–45)
HGB BLD-MCNC: 12 G/DL — SIGNIFICANT CHANGE UP (ref 11.5–15.5)
IANC: 6.97 K/UL — SIGNIFICANT CHANGE UP (ref 1.8–7.4)
IMM GRANULOCYTES NFR BLD AUTO: 0.4 % — SIGNIFICANT CHANGE UP (ref 0–1.5)
KETONES UR-MCNC: NEGATIVE — SIGNIFICANT CHANGE UP
LEUKOCYTE ESTERASE UR-ACNC: ABNORMAL
LIDOCAIN IGE QN: 24 U/L — SIGNIFICANT CHANGE UP (ref 7–60)
LYMPHOCYTES # BLD AUTO: 1.43 K/UL — SIGNIFICANT CHANGE UP (ref 1–3.3)
LYMPHOCYTES # BLD AUTO: 15.2 % — SIGNIFICANT CHANGE UP (ref 13–44)
MAGNESIUM SERPL-MCNC: 1.9 MG/DL — SIGNIFICANT CHANGE UP (ref 1.6–2.6)
MCHC RBC-ENTMCNC: 31.3 PG — SIGNIFICANT CHANGE UP (ref 27–34)
MCHC RBC-ENTMCNC: 33.5 GM/DL — SIGNIFICANT CHANGE UP (ref 32–36)
MCV RBC AUTO: 93.2 FL — SIGNIFICANT CHANGE UP (ref 80–100)
MONOCYTES # BLD AUTO: 0.86 K/UL — SIGNIFICANT CHANGE UP (ref 0–0.9)
MONOCYTES NFR BLD AUTO: 9.2 % — SIGNIFICANT CHANGE UP (ref 2–14)
NEUTROPHILS # BLD AUTO: 6.97 K/UL — SIGNIFICANT CHANGE UP (ref 1.8–7.4)
NEUTROPHILS NFR BLD AUTO: 74.4 % — SIGNIFICANT CHANGE UP (ref 43–77)
NITRITE UR-MCNC: NEGATIVE — SIGNIFICANT CHANGE UP
NRBC # BLD: 0 /100 WBCS — SIGNIFICANT CHANGE UP
NRBC # FLD: 0 K/UL — SIGNIFICANT CHANGE UP
PH UR: 6 — SIGNIFICANT CHANGE UP (ref 5–8)
PHOSPHATE SERPL-MCNC: 4.2 MG/DL — SIGNIFICANT CHANGE UP (ref 2.5–4.5)
PLATELET # BLD AUTO: 303 K/UL — SIGNIFICANT CHANGE UP (ref 150–400)
POTASSIUM SERPL-MCNC: 3.5 MMOL/L — SIGNIFICANT CHANGE UP (ref 3.5–5.3)
POTASSIUM SERPL-SCNC: 3.5 MMOL/L — SIGNIFICANT CHANGE UP (ref 3.5–5.3)
PROT SERPL-MCNC: 7.5 G/DL — SIGNIFICANT CHANGE UP (ref 6–8.3)
PROT UR-MCNC: NEGATIVE — SIGNIFICANT CHANGE UP
RBC # BLD: 3.84 M/UL — SIGNIFICANT CHANGE UP (ref 3.8–5.2)
RBC # FLD: 11.5 % — SIGNIFICANT CHANGE UP (ref 10.3–14.5)
RBC CASTS # UR COMP ASSIST: SIGNIFICANT CHANGE UP /HPF (ref 0–4)
SODIUM SERPL-SCNC: 135 MMOL/L — SIGNIFICANT CHANGE UP (ref 135–145)
SP GR SPEC: 1.02 — SIGNIFICANT CHANGE UP (ref 1–1.05)
URATE CRY FLD QL MICRO: ABNORMAL
UROBILINOGEN FLD QL: SIGNIFICANT CHANGE UP
WBC # BLD: 9.38 K/UL — SIGNIFICANT CHANGE UP (ref 3.8–10.5)
WBC # FLD AUTO: 9.38 K/UL — SIGNIFICANT CHANGE UP (ref 3.8–10.5)
WBC UR QL: SIGNIFICANT CHANGE UP /HPF (ref 0–5)

## 2022-06-01 PROCEDURE — 99285 EMERGENCY DEPT VISIT HI MDM: CPT

## 2022-06-01 RX ORDER — ACETAMINOPHEN 500 MG
650 TABLET ORAL ONCE
Refills: 0 | Status: COMPLETED | OUTPATIENT
Start: 2022-06-01 | End: 2022-06-01

## 2022-06-01 RX ORDER — SODIUM CHLORIDE 9 MG/ML
1000 INJECTION INTRAMUSCULAR; INTRAVENOUS; SUBCUTANEOUS ONCE
Refills: 0 | Status: COMPLETED | OUTPATIENT
Start: 2022-06-01 | End: 2022-06-01

## 2022-06-01 RX ADMIN — SODIUM CHLORIDE 1000 MILLILITER(S): 9 INJECTION INTRAMUSCULAR; INTRAVENOUS; SUBCUTANEOUS at 22:46

## 2022-06-01 NOTE — ED ADULT NURSE NOTE - OBJECTIVE STATEMENT
Pt. is a 31 y.o female who presents w/ c/o abdominal pain. Pt. A&Ox4 and ambulatory. Pmhx HIV, depression, SI. Pt. endorsing diarrhea, abdominal cramping and nausea x1day. Denies vomiting/fevers/chills/CP/SOB. Pt. states she is 8 weeks pregnant, not yet confirmed w. ultrasound. Pt. endorsing history of SI and failed suicide attempts. Denies current feelings of depression or SI. Resp. equal and unlabored b/l. Abdomen soft, non-distended, mildly tender to palpation. NAD. #20g IV established to LAC. Comfort measures provided, safety measures implemented, call bell in reach.

## 2022-06-02 VITALS
SYSTOLIC BLOOD PRESSURE: 110 MMHG | DIASTOLIC BLOOD PRESSURE: 68 MMHG | HEART RATE: 64 BPM | TEMPERATURE: 98 F | OXYGEN SATURATION: 100 % | RESPIRATION RATE: 18 BRPM

## 2022-06-02 PROCEDURE — 76817 TRANSVAGINAL US OBSTETRIC: CPT | Mod: 26

## 2022-06-02 RX ORDER — ONDANSETRON 8 MG/1
1 TABLET, FILM COATED ORAL
Qty: 12 | Refills: 0
Start: 2022-06-02 | End: 2022-06-05

## 2022-06-02 RX ORDER — CEPHALEXIN 500 MG
1 CAPSULE ORAL
Qty: 40 | Refills: 0
Start: 2022-06-02 | End: 2022-06-11

## 2022-06-02 NOTE — ED PROVIDER NOTE - NSFOLLOWUPCLINICS_GEN_ALL_ED_FT
[facility] Gynecology and Obstetrics  Gynceology/OB  [address] [place] [address]  Phone: [phone]  Fax:   Follow Up Time: Routine

## 2022-06-02 NOTE — ED PROVIDER NOTE - OBJECTIVE STATEMENT
32 Y/O F G1PO LMP 4/22 w/ PMH of appendectomy and HIV on HAART presents to ER w/ abdominal pain. Admits to onset of symptoms earlier today. Associated nausea and NBNB vomiting. Had multiple episodes of diarrhea. No recent travel, no sick contacts, no recent abx use, no dietary changes. Has not yet seen GYN ([facility]). Denies fever, dizziness, headache, chest pain, shortness of breath, palpitations, syncope, dysuira, hematuria. No Marijuana use.

## 2022-06-02 NOTE — ED PROVIDER NOTE - PATIENT PORTAL LINK FT
You can access the FollowMyHealth Patient Portal offered by [facility] by registering at the following website: [email or web address]. By joining [employer]’s FollowMyHealth portal, you will also be able to view your health information using other applications (apps) compatible with our system.

## 2022-06-02 NOTE — ED PROVIDER NOTE - NS ED ATTENDING STATEMENT MOD
This was a shared visit with the AIRAM. I reviewed and verified the documentation and independently performed the documented:

## 2022-06-02 NOTE — ED PROVIDER NOTE - CLINICAL SUMMARY MEDICAL DECISION MAKING FREE TEXT BOX
30 Y/O F G1PO LMP 4/22 w/ PMH of appendectomy and HIV on HAART presents to ER w/ abdominal pain.   TVUS r/o ectopic  Symptom management  Re-evaluate

## 2022-06-02 NOTE — ED PROVIDER NOTE - PHYSICAL EXAMINATION
Vital signs reviewed.   CONSTITUTIONAL: Well-appearing; well-nourished; in no apparent distress. Non-toxic appearing.   HEAD: Normocephalic, atraumatic.  EYES: Normal conjunctiva and no sclera injection noted  ENT: normal nose; no rhinorrhea  CARD: Normal S1, S2  RESP: Normal chest excursion with respiration; breath sounds clear and equal bilaterally.  ABD/GI: soft, non-distended; non-tender; no CVA tenderness  : [place]. No external masses/lesions noted. LT adnexal tenderness on exam  EXT/MS: moves all extremities; distal pulses are normal, no pedal edema.  SKIN: Normal for age and race; warm; dry; good turgor; no apparent lesions or exudate noted.  NEURO: Awake, alert, oriented x 3,  PSYCH: Normal mood; appropriate affect.

## 2022-06-02 NOTE — ED PROVIDER NOTE - ATTENDING APP SHARED VISIT CONTRIBUTION OF CARE
The patient is a 31y Female who Pt , 8 weeks pregnant and  has a past medical and surgery history of HIV (ABS CD4: 16 /uL 01.10.20 @ 17:33) appendectomy and inguinal hernia repair PTED with c/o severe pelvic pain, nausea no vomiting and diarrhea that began today. Pt does not feel well but it is concerns with her pregnancy that prompted visit   Vital Signs Last 24 Hrs  T(F): 97.8 HR: 64 BP: 110/68 RR: 18 SpO2: 100% (2022 01:00)   PE: as described; my additions and exceptions are noted in the chart    DATA:  LAB:                       12.0   9.38  )-----------( 303      ( 2022 22:39 )             35.8   Mean Cell Volume: 93.2 fL (22 @ 22:39)  Auto Neutrophil %: 74.4 % (22 @ 22:39)  Auto Eosinophil %: 0.6 % (22 @ 22:39)      135  |  103  |  8   ----------------------------<  98  3.5   |  20<L>  |  0.64    Ca    9.7      2022 22:39  Phos  4.2       Mg     1.90         TPro  7.5  /  Alb  4.3  /  TBili  <0.2  /  DBili  x   /  AST  17  /  ALT  9   /  AlkPhos  72    Lipase, Serum: 24 U/L (22 @ 22:39)     Urinalysis Basic - ( 2022 22:53 )  Color: Light Yellow / Appearance: Clear / S.020 / pH: x  Gluc: x / Ketone: Negative  / Bili: Negative / Urobili: <2 mg/dL   Blood: x / Protein: Negative / Nitrite: Negative   Leuk Esterase: Large / RBC: 0-2 /HPF / WBC 25-50 /HPF   Sq Epi: x / Non Sq Epi: x / Bacteria: x       IMPRESSION/RISK:  Dx= GE    Consideration include HIV expands the number of culprit organisms but not management  Plan  ivfs   apap  will perform sono for fetal status  Ua warrants treatment will send cx  reassess  if normal will d/c with followup

## 2022-06-02 NOTE — ED PROVIDER NOTE - NSFOLLOWUPINSTRUCTIONS_ED_ALL_ED_FT
Urinary Tract Infection    A urinary tract infection (UTI) is an infection of any part of the urinary tract, which includes the kidneys, ureters, bladder, and urethra. Risk factors include ignoring your need to urinate, wiping back to front if female, being an uncircumcised male, and having diabetes or a weak immune system. Symptoms include frequent urination, pain or burning with urination, foul smelling urine, cloudy urine, pain in the lower abdomen, blood in the urine, and fever. If you were prescribed an antibiotic medicine, take it as told by your health care provider. Do not stop taking the antibiotic even if you start to feel better.    SEEK IMMEDIATE MEDICAL CARE IF YOU HAVE ANY OF THE FOLLOWING SYMPTOMS: severe back or abdominal pain, fever, inability to keep fluids or medicine down, dizziness/lightheadedness, or a change in mental status.    Abdominal Pain    Many things can cause abdominal pain. Many times, abdominal pain is not caused by a disease and will improve without treatment. Your health care provider will do a physical exam to determine if there is a dangerous cause of your pain; blood tests and imaging may help determine the cause of your pain. However, in many cases, no cause may be found and you may need further testing as an outpatient. Monitor your abdominal pain for any changes.     SEEK IMMEDIATE MEDICAL CARE IF YOU HAVE ANY OF THE FOLLOWING SYMPTOMS: worsening abdominal pain, uncontrollable vomiting, profuse diarrhea, inability to have bowel movements or pass gas, black or bloody stools, fever accompanying chest pain or back pain, or fainting. These symptoms may represent a serious problem that is an emergency. Do not wait to see if the symptoms will go away. Get medical help right away. Call 911 and do not drive yourself to the hospital.

## 2022-06-03 LAB
CULTURE RESULTS: SIGNIFICANT CHANGE UP
SPECIMEN SOURCE: SIGNIFICANT CHANGE UP

## 2022-06-20 NOTE — ED BEHAVIORAL HEALTH ASSESSMENT NOTE - PRIMARY DX
General FM note    Bard Vasquez is a 59 y.o. female who presents today for follow up on her  medical conditions as noted below. Bard Vasquez is c/o of   Chief Complaint   Patient presents with    Headache       Patient Active Problem List:     Intractable chronic migraine without aura and without status migrainosus     Essential hypertension     Past Medical History:   Diagnosis Date    Acid reflux     Anemia     Hypertension     Migraine     Mitral valve prolapse     Osteoarthritis       Past Surgical History:   Procedure Laterality Date     SECTION      COLONOSCOPY      COLONOSCOPY  2018    Screening; with 1 biopsy    COLONOSCOPY N/A 2018    COLONOSCOPY WITH BIOPSY performed by Rosa Esqueda MD at [address], COLON, DIAGNOSTIC      OTHER SURGICAL HISTORY      uterine ablation    OVARY REMOVAL      TOE SURGERY       Family History   Problem Relation Age of Onset    Neuropathy Mother     Heart Attack Father      Current Outpatient Medications   Medication Sig Dispense Refill    methocarbamol (ROBAXIN) 500 MG tablet Take 1 tablet by mouth 4 times daily for 10 days 40 tablet 0    butalbital-acetaminophen-caffeine (FIORICET, ESGIC) -40 MG per tablet Take 1 tablet by mouth every 4 hours as needed for Headaches 30 tablet 0    Ubrogepant 50 MG TABS Take 50 mg by mouth daily as needed (take if needed up to 4 tbs a day.) 30 tablet 1    RABEprazole (ACIPHEX) 20 MG tablet TAKE 1 TABLET BY MOUTH EVERY DAY 90 tablet 1    pravastatin (PRAVACHOL) 40 MG tablet TAKE 1 TABLET BY MOUTH EVERY DAY 90 tablet 1    spironolactone (ALDACTONE) 50 MG tablet TAKE 1 TABLET BY MOUTH EVERY DAY 90 tablet 1    losartan (COZAAR) 100 MG tablet TAKE 1 TABLET BY MOUTH EVERY DAY 90 tablet 1    SUMAtriptan (IMITREX) 100 MG tablet TAKE 1 TABLET BY MOUTH ONCE AS NEEDED FOR MIGRAINE 27 tablet 1    Misc.  Devices MISC 1 each by Does not apply route once for 1 dose Recommendation: 2 days off a week. 2 each 0     No current facility-administered medications for this visit. ALLERGIES:    Allergies   Allergen Reactions    Percocet [Oxycodone-Acetaminophen] Hives       Social History     Tobacco Use    Smoking status: Never Smoker    Smokeless tobacco: Never Used   Substance Use Topics    Alcohol use: No      Body mass index is 18.84 kg/m². /89   Pulse 75   Temp 98 °F (36.7 °C)   Wt 113 lb 3.2 oz (51.3 kg)   SpO2 98%   BMI 18.84 kg/m²     Subjective:      HPI    59 y.o. female coming today because of headaches since Christmas. She states for the last couple of months she has been having some neck pain then radiating to her head. She wakes up headache. She states there is a big knot in her right neck area more so trap area. She states that she did started to see a chiropractor and overall she has been better. She has been using half of Benadryl over-the-counter for sleep and she sleeps quite well with Benadryl. But she states that she would like to know why she wakes symptomatic in the morning again she feels it has improved some. She also states that she has been using Imitrex for her migraines but makes her feel not good she would like to try something different. She also tells me that she had an IT band tendinitis for some time. She still runs but only 10 and 5K's. Review of Systems   Constitutional: Negative for fever and unexpected weight change. Pertinent items are noted in HPI. Objective:   Physical Exam  Constitutional: VS (see above). General appearance: normal development, habitus and attention, no deformities. No distress. Eyes: normal conjunctiva and lids. CAV: RRR, no RMG. No edema lower extremities. Pulmo: CTA bilateral, no CWR. Skin: no rashes, lesions or ulcers. Musculoskeletal: normal gait. Nails: no clubbing or cyanosis. Psychiatric: alert and oriented to place, time and person. Normal mood and affect.     Assessment:       Diagnosis Orders   1. Chronic intractable headache, unspecified headache type  methocarbamol (ROBAXIN) 500 MG tablet    butalbital-acetaminophen-caffeine (FIORICET, ESGIC) -40 MG per tablet    Ubrogepant 50 MG TABS       Plan:   I will start the patient on a different muscle relaxants as I feel that the trap strain neck pain could attribute to her headaches. Fioricet provided discussed with her that she cannot take a lot of this medication due to rebound headaches. Hopefully the Nora Parrish will be paid by her insurance for acute migraines. As the patient has tried Imitrex also Topamax as migraine prevention and Elavil. Return in about 6 months (around 12/20/2022). No orders of the defined types were placed in this encounter. Orders Placed This Encounter   Medications    methocarbamol (ROBAXIN) 500 MG tablet     Sig: Take 1 tablet by mouth 4 times daily for 10 days     Dispense:  40 tablet     Refill:  0    butalbital-acetaminophen-caffeine (FIORICET, ESGIC) -40 MG per tablet     Sig: Take 1 tablet by mouth every 4 hours as needed for Headaches     Dispense:  30 tablet     Refill:  0    Ubrogepant 50 MG TABS     Sig: Take 50 mg by mouth daily as needed (take if needed up to 4 tbs a day.)     Dispense:  30 tablet     Refill:  1       Call or return to clinic prn if these symptoms worsen or fail to improve as anticipated. I have reviewed the instructions with the patient, answering all questions to patient's satisfaction. Giovanny Kevin received counseling on the following healthy behaviors: nutrition, exercise, and medication adherence  Reviewed prior labs and health maintenance. Continue current medications, diet and exercise. Discussed use, benefit, and side effects of prescribed medications. Barriers to medication compliance addressed. Patient given educational materials - see patient instructions. All patient questions answered. Patient voiced understanding.       Electronically signed by Yesy Connolly Christine Berman MD on 6/20/2022 at 1:39 PM       (Please note that portions of this note were completed with a voice recognition program. Efforts were made to edit the dictations but occasionally words are mis-transcribed.) Adjustment disorder

## 2022-08-24 NOTE — ED PROVIDER NOTE - PRINCIPAL DIAGNOSIS
[facility] - Non-Surgical Weight Loss Program    REASON FOR VISIT:    Chief Complaint:    Chief Complaint   Patient presents with    Weight Loss     Weigh In         OBJECTIVE:  Physical Exam   Ht 5' 2.5\" (1.588 m)   Wt 194 lb (88 kg)   LMP 07/09/2016   BMI 34.92 kg/m²                Patient gained 5.8lbs
Abdominal pain in pregnancy

## 2022-12-08 NOTE — ED ADULT NURSE NOTE - BREATH SOUNDS, MLM
Detail Level: Detailed Quality 110: Preventive Care And Screening: Influenza Immunization: Influenza Immunization Administered during Influenza season Clear

## 2023-01-01 ENCOUNTER — INPATIENT (INPATIENT)
Facility: HOSPITAL | Age: 33
LOS: 2 days | Discharge: HOME | End: 2023-01-04
Attending: SPECIALIST | Admitting: SPECIALIST
Payer: MEDICAID

## 2023-01-01 VITALS
SYSTOLIC BLOOD PRESSURE: 122 MMHG | DIASTOLIC BLOOD PRESSURE: 81 MMHG | HEART RATE: 87 BPM | TEMPERATURE: 98 F | RESPIRATION RATE: 16 BRPM

## 2023-01-01 DIAGNOSIS — O26.899 OTHER SPECIFIED PREGNANCY RELATED CONDITIONS, UNSPECIFIED TRIMESTER: ICD-10-CM

## 2023-01-01 DIAGNOSIS — Z3A.39 39 WEEKS GESTATION OF PREGNANCY: ICD-10-CM

## 2023-01-01 DIAGNOSIS — Z96.22 MYRINGOTOMY TUBE(S) STATUS: Chronic | ICD-10-CM

## 2023-01-01 DIAGNOSIS — I10 ESSENTIAL (PRIMARY) HYPERTENSION: ICD-10-CM

## 2023-01-01 DIAGNOSIS — Z90.49 ACQUIRED ABSENCE OF OTHER SPECIFIED PARTS OF DIGESTIVE TRACT: Chronic | ICD-10-CM

## 2023-01-01 DIAGNOSIS — Z98.890 OTHER SPECIFIED POSTPROCEDURAL STATES: Chronic | ICD-10-CM

## 2023-01-01 DIAGNOSIS — B20 HUMAN IMMUNODEFICIENCY VIRUS [HIV] DISEASE: ICD-10-CM

## 2023-01-01 LAB
ALBUMIN SERPL ELPH-MCNC: 4 G/DL — SIGNIFICANT CHANGE UP (ref 3.3–5)
ALP SERPL-CCNC: 457 U/L — HIGH (ref 40–120)
ALT FLD-CCNC: 11 U/L — SIGNIFICANT CHANGE UP (ref 4–33)
AMPHET UR-MCNC: NEGATIVE — SIGNIFICANT CHANGE UP
ANION GAP SERPL CALC-SCNC: 15 MMOL/L — HIGH (ref 7–14)
APPEARANCE UR: ABNORMAL
APTT BLD: 25.5 SEC — LOW (ref 27–36.3)
AST SERPL-CCNC: 17 U/L — SIGNIFICANT CHANGE UP (ref 4–32)
BACTERIA # UR AUTO: NEGATIVE — SIGNIFICANT CHANGE UP
BARBITURATES UR SCN-MCNC: NEGATIVE — SIGNIFICANT CHANGE UP
BASOPHILS # BLD AUTO: 0.02 K/UL — SIGNIFICANT CHANGE UP (ref 0–0.2)
BASOPHILS NFR BLD AUTO: 0.2 % — SIGNIFICANT CHANGE UP (ref 0–2)
BENZODIAZ UR-MCNC: NEGATIVE — SIGNIFICANT CHANGE UP
BILIRUB SERPL-MCNC: 0.3 MG/DL — SIGNIFICANT CHANGE UP (ref 0.2–1.2)
BILIRUB UR-MCNC: NEGATIVE — SIGNIFICANT CHANGE UP
BLD GP AB SCN SERPL QL: NEGATIVE — SIGNIFICANT CHANGE UP
BUN SERPL-MCNC: 4 MG/DL — LOW (ref 7–23)
CALCIUM SERPL-MCNC: 9.8 MG/DL — SIGNIFICANT CHANGE UP (ref 8.4–10.5)
CHLORIDE SERPL-SCNC: 102 MMOL/L — SIGNIFICANT CHANGE UP (ref 98–107)
CO2 SERPL-SCNC: 18 MMOL/L — LOW (ref 22–31)
COCAINE METAB.OTHER UR-MCNC: NEGATIVE — SIGNIFICANT CHANGE UP
COLOR SPEC: COLORLESS — SIGNIFICANT CHANGE UP
COVID-19 SPIKE DOMAIN AB INTERP: POSITIVE
COVID-19 SPIKE DOMAIN ANTIBODY RESULT: >250 U/ML — HIGH
CREAT ?TM UR-MCNC: 16 MG/DL — SIGNIFICANT CHANGE UP
CREAT SERPL-MCNC: 0.54 MG/DL — SIGNIFICANT CHANGE UP (ref 0.5–1.3)
CREATININE URINE RESULT, DAU: 16 MG/DL — SIGNIFICANT CHANGE UP
DIFF PNL FLD: ABNORMAL
EGFR: 126 ML/MIN/1.73M2 — SIGNIFICANT CHANGE UP
EOSINOPHIL # BLD AUTO: 0.06 K/UL — SIGNIFICANT CHANGE UP (ref 0–0.5)
EOSINOPHIL NFR BLD AUTO: 0.5 % — SIGNIFICANT CHANGE UP (ref 0–6)
EPI CELLS # UR: 3 /HPF — SIGNIFICANT CHANGE UP (ref 0–5)
FIBRINOGEN PPP-MCNC: 651 MG/DL — HIGH (ref 200–465)
GLUCOSE SERPL-MCNC: 83 MG/DL — SIGNIFICANT CHANGE UP (ref 70–99)
GLUCOSE UR QL: NEGATIVE — SIGNIFICANT CHANGE UP
HBV SURFACE AG SERPL QL IA: SIGNIFICANT CHANGE UP
HCT VFR BLD CALC: 36.4 % — SIGNIFICANT CHANGE UP (ref 34.5–45)
HGB BLD-MCNC: 11.9 G/DL — SIGNIFICANT CHANGE UP (ref 11.5–15.5)
HIV 1+2 AB+HIV1 P24 AG SERPL QL IA: (no result)
HYALINE CASTS # UR AUTO: 0 /LPF — SIGNIFICANT CHANGE UP (ref 0–7)
IANC: 9.6 K/UL — HIGH (ref 1.8–7.4)
IMM GRANULOCYTES NFR BLD AUTO: 0.9 % — SIGNIFICANT CHANGE UP (ref 0–0.9)
INR BLD: 0.97 RATIO — SIGNIFICANT CHANGE UP (ref 0.88–1.16)
KETONES UR-MCNC: NEGATIVE — SIGNIFICANT CHANGE UP
LDH SERPL L TO P-CCNC: 179 U/L — SIGNIFICANT CHANGE UP (ref 135–225)
LEUKOCYTE ESTERASE UR-ACNC: ABNORMAL
LYMPHOCYTES # BLD AUTO: 1.23 K/UL — SIGNIFICANT CHANGE UP (ref 1–3.3)
LYMPHOCYTES # BLD AUTO: 10.5 % — LOW (ref 13–44)
MCHC RBC-ENTMCNC: 30.1 PG — SIGNIFICANT CHANGE UP (ref 27–34)
MCHC RBC-ENTMCNC: 32.7 GM/DL — SIGNIFICANT CHANGE UP (ref 32–36)
MCV RBC AUTO: 92.2 FL — SIGNIFICANT CHANGE UP (ref 80–100)
METHADONE UR-MCNC: NEGATIVE — SIGNIFICANT CHANGE UP
MONOCYTES # BLD AUTO: 0.74 K/UL — SIGNIFICANT CHANGE UP (ref 0–0.9)
MONOCYTES NFR BLD AUTO: 6.3 % — SIGNIFICANT CHANGE UP (ref 2–14)
NEUTROPHILS # BLD AUTO: 9.6 K/UL — HIGH (ref 1.8–7.4)
NEUTROPHILS NFR BLD AUTO: 81.6 % — HIGH (ref 43–77)
NITRITE UR-MCNC: NEGATIVE — SIGNIFICANT CHANGE UP
NRBC # BLD: 0 /100 WBCS — SIGNIFICANT CHANGE UP (ref 0–0)
NRBC # FLD: 0 K/UL — SIGNIFICANT CHANGE UP (ref 0–0)
OPIATES UR-MCNC: NEGATIVE — SIGNIFICANT CHANGE UP
OXYCODONE UR-MCNC: NEGATIVE — SIGNIFICANT CHANGE UP
PCP SPEC-MCNC: SIGNIFICANT CHANGE UP
PCP UR-MCNC: NEGATIVE — SIGNIFICANT CHANGE UP
PH UR: 6.5 — SIGNIFICANT CHANGE UP (ref 5–8)
PLATELET # BLD AUTO: 223 K/UL — SIGNIFICANT CHANGE UP (ref 150–400)
POTASSIUM SERPL-MCNC: 3.8 MMOL/L — SIGNIFICANT CHANGE UP (ref 3.5–5.3)
POTASSIUM SERPL-SCNC: 3.8 MMOL/L — SIGNIFICANT CHANGE UP (ref 3.5–5.3)
PROT ?TM UR-MCNC: 5 MG/DL — SIGNIFICANT CHANGE UP
PROT SERPL-MCNC: 7.8 G/DL — SIGNIFICANT CHANGE UP (ref 6–8.3)
PROT UR-MCNC: ABNORMAL
PROT/CREAT UR-RTO: 0.3 RATIO — HIGH (ref 0–0.2)
PROTHROM AB SERPL-ACNC: 11.3 SEC — SIGNIFICANT CHANGE UP (ref 10.5–13.4)
RBC # BLD: 3.95 M/UL — SIGNIFICANT CHANGE UP (ref 3.8–5.2)
RBC # FLD: 13.3 % — SIGNIFICANT CHANGE UP (ref 10.3–14.5)
RBC CASTS # UR COMP ASSIST: 1 /HPF — SIGNIFICANT CHANGE UP (ref 0–4)
RH IG SCN BLD-IMP: POSITIVE — SIGNIFICANT CHANGE UP
RH IG SCN BLD-IMP: POSITIVE — SIGNIFICANT CHANGE UP
SARS-COV-2 IGG+IGM SERPL QL IA: >250 U/ML — HIGH
SARS-COV-2 IGG+IGM SERPL QL IA: POSITIVE
SARS-COV-2 RNA SPEC QL NAA+PROBE: SIGNIFICANT CHANGE UP
SODIUM SERPL-SCNC: 135 MMOL/L — SIGNIFICANT CHANGE UP (ref 135–145)
SP GR SPEC: 1.01 — LOW (ref 1.01–1.05)
THC UR QL: NEGATIVE — SIGNIFICANT CHANGE UP
URATE SERPL-MCNC: 4.6 MG/DL — SIGNIFICANT CHANGE UP (ref 2.5–7)
UROBILINOGEN FLD QL: SIGNIFICANT CHANGE UP
WBC # BLD: 11.75 K/UL — HIGH (ref 3.8–10.5)
WBC # FLD AUTO: 11.75 K/UL — HIGH (ref 3.8–10.5)
WBC UR QL: 26 /HPF — HIGH (ref 0–5)

## 2023-01-01 PROCEDURE — 93010 ELECTROCARDIOGRAM REPORT: CPT

## 2023-01-01 PROCEDURE — 76818 FETAL BIOPHYS PROFILE W/NST: CPT | Mod: 26

## 2023-01-01 RX ORDER — CITRIC ACID/SODIUM CITRATE 300-500 MG
15 SOLUTION, ORAL ORAL EVERY 6 HOURS
Refills: 0 | Status: DISCONTINUED | OUTPATIENT
Start: 2023-01-01 | End: 2023-01-02

## 2023-01-01 RX ORDER — BUTORPHANOL TARTRATE 2 MG/ML
2 INJECTION, SOLUTION INTRAMUSCULAR; INTRAVENOUS ONCE
Refills: 0 | Status: DISCONTINUED | OUTPATIENT
Start: 2023-01-01 | End: 2023-01-01

## 2023-01-01 RX ORDER — SODIUM CHLORIDE 9 MG/ML
1000 INJECTION, SOLUTION INTRAVENOUS
Refills: 0 | Status: DISCONTINUED | OUTPATIENT
Start: 2023-01-01 | End: 2023-01-02

## 2023-01-01 RX ORDER — ONDANSETRON 8 MG/1
4 TABLET, FILM COATED ORAL EVERY 6 HOURS
Refills: 0 | Status: DISCONTINUED | OUTPATIENT
Start: 2023-01-01 | End: 2023-01-02

## 2023-01-01 RX ORDER — NALOXONE HYDROCHLORIDE 4 MG/.1ML
0.1 SPRAY NASAL
Refills: 0 | Status: DISCONTINUED | OUTPATIENT
Start: 2023-01-01 | End: 2023-01-02

## 2023-01-01 RX ORDER — SODIUM CHLORIDE 9 MG/ML
1000 INJECTION, SOLUTION INTRAVENOUS ONCE
Refills: 0 | Status: DISCONTINUED | OUTPATIENT
Start: 2023-01-01 | End: 2023-01-02

## 2023-01-01 RX ORDER — CHLORHEXIDINE GLUCONATE 213 G/1000ML
1 SOLUTION TOPICAL ONCE
Refills: 0 | Status: DISCONTINUED | OUTPATIENT
Start: 2023-01-01 | End: 2023-01-02

## 2023-01-01 RX ORDER — ACETAMINOPHEN 500 MG
1000 TABLET ORAL ONCE
Refills: 0 | Status: COMPLETED | OUTPATIENT
Start: 2023-01-01 | End: 2023-01-01

## 2023-01-01 RX ORDER — OXYTOCIN 10 UNIT/ML
333.33 VIAL (ML) INJECTION
Qty: 20 | Refills: 0 | Status: COMPLETED | OUTPATIENT
Start: 2023-01-01 | End: 2023-01-01

## 2023-01-01 RX ORDER — FENTANYL CITRATE 50 UG/ML
10 INJECTION INTRAVENOUS
Refills: 0 | Status: DISCONTINUED | OUTPATIENT
Start: 2023-01-01 | End: 2023-01-02

## 2023-01-01 RX ORDER — DOLUTEGRAVIR SODIUM 25 MG/1
50 TABLET, FILM COATED ORAL DAILY
Refills: 0 | Status: DISCONTINUED | OUTPATIENT
Start: 2023-01-01 | End: 2023-01-04

## 2023-01-01 RX ORDER — EMTRICITABINE AND TENOFOVIR DISOPROXIL FUMARATE 200; 300 MG/1; MG/1
1 TABLET, FILM COATED ORAL DAILY
Refills: 0 | Status: DISCONTINUED | OUTPATIENT
Start: 2023-01-01 | End: 2023-01-04

## 2023-01-01 RX ORDER — FENTANYL CITRATE 50 UG/ML
30 INJECTION INTRAVENOUS
Refills: 0 | Status: DISCONTINUED | OUTPATIENT
Start: 2023-01-01 | End: 2023-01-02

## 2023-01-01 RX ADMIN — EMTRICITABINE AND TENOFOVIR DISOPROXIL FUMARATE 1 TABLET(S): 200; 300 TABLET, FILM COATED ORAL at 20:57

## 2023-01-01 RX ADMIN — DOLUTEGRAVIR SODIUM 50 MILLIGRAM(S): 25 TABLET, FILM COATED ORAL at 20:58

## 2023-01-01 RX ADMIN — Medication 34.7 MG/KG/HR: at 18:29

## 2023-01-01 RX ADMIN — Medication 150 MILLIGRAM(S): at 17:25

## 2023-01-01 RX ADMIN — Medication 1000 MILLIGRAM(S): at 17:35

## 2023-01-01 RX ADMIN — SODIUM CHLORIDE 125 MILLILITER(S): 9 INJECTION, SOLUTION INTRAVENOUS at 14:00

## 2023-01-01 RX ADMIN — Medication 400 MILLIGRAM(S): at 17:20

## 2023-01-01 NOTE — OB PROVIDER H&P - PROBLEM SELECTOR PLAN 3
no evidence of active labor  BPP reassuring, ice chips given for minimal variability on NST    NST improved s/p ice chips; category 1 FHT  pt desires induction of labor, c/o pain with contractions  d/w Dr. Mcintosh OB service attending and Dr. Storm chief OB resident  pt to be admitted for IOL for HIV+  induction agent to be determined on labor floor  AZT to be ordered for antiretroviral therapy during induction process  routine labor orders  Prenatal labs ordered. Pt signed medical release of information, called and faxed consent form to [facility] for prenatal records  Labor and delivery consent and blood transfusion consent obtained. Verbal consent obtained for urine toxicology  CMP, coags, UA ordered  glucose finger stick x1 on admission  HIV viral load and T cell panel ordered  COVID PCR obtained and sent

## 2023-01-01 NOTE — OB PROVIDER H&P - PROBLEM SELECTOR PLAN 2
no evidence of active labor  BPP reassuring, ice chips given for minimal variability on NST    NST improved s/p ice chips; category 1 FHT  pt desires induction of labor, c/o pain with contractions  d/w Dr. Mcintosh OB service attending and Dr. Storm chief OB resident  pt to be admitted for IOL for HIV+  induction agent to be determined on labor floor  AZT to be ordered for antiretroviral therapy during induction process  routine labor orders  Prenatal labs ordered. Pt signed medical release of information, called and faxed consent form to [facility] for prenatal records  Labor and delivery consent and blood transfusion consent obtained. Verbal consent obtained for urine toxicology  CMP, coags, UA ordered  glucose finger stick x1 on admission  HIV viral load and T cell panel ordered  COVID PCR obtained and sent no evidence of active labor  BPP reassuring, ice chips given for minimal variability on NST    NST improved s/p ice chips; category 1 FHT  pt desires induction of labor, c/o pain with contractions  d/w Dr. Mcintosh OB service attending and Dr. Marques chief OB resident  pt to be admitted for IOL for HIV+  induction agent to be determined on labor floor  AZT to be ordered for antiretroviral therapy during induction process  routine labor orders  Prenatal labs ordered. Pt signed medical release of information, called and faxed consent form to [facility] for prenatal records  Labor and delivery consent and blood transfusion consent obtained. Verbal consent obtained for urine toxicology  CMP, coags, UA ordered  glucose finger stick x1 on admission  HIV viral load and T cell panel ordered  COVID PCR obtained and sent

## 2023-01-01 NOTE — OB RN PATIENT PROFILE - HEIGHT IN CM
162.56 - Home: metformin 1000mg BID, jardiance 25mg qd  - elevated urine glucose likely in the setting of Jardiance use  - refer to problem 1 - Home: metformin 1000mg BID, jardiance 25mg qd  - elevated urine glucose likely in the setting of Jardiance use  - refer to problem 1  - has OP fu with endocrinology

## 2023-01-01 NOTE — OB PROVIDER TRIAGE NOTE - NSICDXPASTMEDICALHX_GEN_ALL_CORE_FT
PAST MEDICAL HISTORY:  Bilateral ovarian cysts     Chronic hypertension     HIV (human immunodeficiency virus infection)

## 2023-01-01 NOTE — OB PROVIDER H&P - NSHPPHYSICALEXAM_GEN_ALL_CORE
A&O x3 in NAD  FHT: 130bpm minimal variability, no accels no decels  TOCO: mild irregular ctx q 8-10 min  abdomen: gravid, soft, nontender  SVE: 1.5/70/-3  TAS: cephalic presentation  fundal placenta  MARY 11.98  BPP 8/8  EFW: 3629 grams    Vital Signs Last 24 Hrs  T(C): 36.0 (01 Jan 2023 11:24), Max: 36.6 (01 Jan 2023 11:03)  T(F): 96.8 (01 Jan 2023 11:24), Max: 97.9 (01 Jan 2023 11:03)  HR: 87 (01 Jan 2023 11:38) (87 - 87)  BP: 107/65 (01 Jan 2023 11:38) (107/65 - 122/81)  BP(mean): --  RR: 16 (01 Jan 2023 11:03) (16 - 16)  SpO2: --

## 2023-01-01 NOTE — OB PROVIDER H&P - NS ATTEND AMEND GEN_ALL_CORE FT
OB Attending    P0 39 weeks with h/o HIV,cHTN, depression with suicide attempts and substance abuse  Patient having from contractions - not in labor  Admitted for IOL due to HIV+   -plan for AZT  -IOL with po cytotec  -TTE given patient reported (but undocumented) cardiac history  -Huddle with nursing and anesthesia held to discuss patient history and plan of care    N Sample-Armando

## 2023-01-01 NOTE — OB RN TRIAGE NOTE - NSICDXPASTSURGICALHX_GEN_ALL_CORE_FT
PAST SURGICAL HISTORY:  H/O ERICA     History of chronic hypertension     S/P appendectomy     S/P inguinal hernia repair

## 2023-01-01 NOTE — OB PROVIDER TRIAGE NOTE - NSICDXPASTSURGICALHX_GEN_ALL_CORE_FT
PAST SURGICAL HISTORY:  H/O LEEP     S/P appendectomy     S/p bilateral myringotomy with tube placement     S/P inguinal hernia repair

## 2023-01-01 NOTE — OB PROVIDER TRIAGE NOTE - NSHPPHYSICALEXAM_GEN_ALL_CORE
A&O x3 in NAD  FHT: 130bpm minimal variability, no accels no decels  TOCO: mild irregular ctx q 8-10 min  abdomen: gravid, soft, nontender  SVE: 1.5/70/-3  TAS: cephalic presentation  fundal placenta  MARY 11.98  BPP 8/8    Vital Signs Last 24 Hrs  T(C): 36.0 (01 Jan 2023 11:24), Max: 36.6 (01 Jan 2023 11:03)  T(F): 96.8 (01 Jan 2023 11:24), Max: 97.9 (01 Jan 2023 11:03)  HR: 87 (01 Jan 2023 11:38) (87 - 87)  BP: 107/65 (01 Jan 2023 11:38) (107/65 - 122/81)  BP(mean): --  RR: 16 (01 Jan 2023 11:03) (16 - 16)  SpO2: -- A&O x3 in NAD  FHT: 130bpm minimal variability, no accels no decels  TOCO: mild irregular ctx q 8-10 min  abdomen: gravid, soft, nontender  SVE: 1.5/70/-3  TAS: cephalic presentation  fundal placenta  MARY 11.98  BPP 8/8  EFW: 3629 grams    Vital Signs Last 24 Hrs  T(C): 36.0 (01 Jan 2023 11:24), Max: 36.6 (01 Jan 2023 11:03)  T(F): 96.8 (01 Jan 2023 11:24), Max: 97.9 (01 Jan 2023 11:03)  HR: 87 (01 Jan 2023 11:38) (87 - 87)  BP: 107/65 (01 Jan 2023 11:38) (107/65 - 122/81)  BP(mean): --  RR: 16 (01 Jan 2023 11:03) (16 - 16)  SpO2: --

## 2023-01-01 NOTE — OB PROVIDER H&P - HISTORY OF PRESENT ILLNESS
33 y/o  at 39.6 weeks gestation transferred from [facility] in [place] via EMS for further evaluation in ruling out labor. SVE at [facility] and found to be 1-2cm dilated, as per report from [facility]. Pt reports wanting to come here for further eval instead of [facility], where she gets her prenatal care from. Pt reports feeling intermittent strong ctx for the last 3 days, every 15-30 mins lasting approx 2 mins, pain 8 out of 10 on pain scale. Pt also endorses loss of mucus plug. Denies lof, vb. Reports + FM    PNC at [facility] clinic, last appt 22. Pt states baby had a growth measurement 2 weeks ago "7lbs 14oz". Pt also reports failing her 1 hr glucose test, and when attempting the 3 hr she vomited the drink and did not complete test (did not repeat test). GBS reported negative (22)    AP course c/b cHTN, HIV+  NKDA  Current medications:  -Tivicay qd @ HS (last taken last night)  -Descovy qd @ HS (last taken last night)  -PNV  OBGYN history:  -h/o abnormal pap smears  -ovarian cysts  -h/o chlamydia years ago (treated)  Medical history:  -cHTN (took meds years ago, no cardiac f/u)  -HIV + (viral load from 22 undetectable, however was detectable in October - labs with CD4 count in [facility]), contracted age 13. Care followed at [facility]  Surgical history:  -R inguinal hernia surgery as a child  -appendectomy  -LEEP procedure 2016  -bilateral myringotomy as child  Pysch history:  -When initially asked about history, pt states "that is irrelevant to this visit". Further into questioning, pt endorses h/o anxiety, depression, manic depression, and h/o suicidal attempts 3 years ago. Pt states feeling safe at home, and feels well at this current time. FOB not involved  Denies current alcohol, smoking and illicit drug use 33 y/o  at 39.6 weeks gestation transferred from [facility] in [place] via EMS for further evaluation in ruling out labor. SVE at [facility] and found to be 1-2cm dilated, as per report from [facility]. Pt reports wanting to come here for further eval instead of [facility], where she gets her prenatal care from. Pt reports feeling intermittent strong ctx for the last 3 days, every 15-30 mins lasting approx 2 mins, pain 8 out of 10 on pain scale. Pt also endorses loss of mucus plug. Denies lof, vb. Reports + FM    PNC at [facility] clinic, last appt 22. Pt states baby had a growth measurement 2 weeks ago "7lbs 14oz". Pt also reports failing her 1 hr glucose test, and when attempting the 3 hr she vomited the drink and did not complete test (did not repeat test). GBS reported negative (22)    AP course c/b cHTN, HIV+  NKDA  Current medications:  -Tivicay qd @ HS (last taken last night)  -Descovy qd @ HS (last taken last night)  -PNV  OBGYN history:  -h/o abnormal pap smears  -ovarian cysts  -h/o chlamydia years ago (treated)  Medical history:  -cHTN (took meds years ago, no cardiac f/u, denies any other cardiac history)  -HIV + (viral load from 22 undetectable, however was detectable in October - labs with CD4 count in [facility]), contracted age 13. Care followed at [facility]  Surgical history:  -R inguinal hernia surgery as a child  -appendectomy  -LEEP procedure 2016  -bilateral myringotomy as child  Pysch history:  -When initially asked about history, pt states "that is irrelevant to this visit". Further into questioning, pt endorses h/o anxiety, depression, manic depression, and h/o suicidal attempts 3 years ago. Pt states feeling safe at home, and feels well at this current time. FOB not involved  Denies current alcohol, smoking and illicit drug use - [facility] + Cocaine and THC use in 2019 31 y/o  at 39.6 weeks gestation transferred from [facility] in [place] via EMS for further evaluation in ruling out labor. SVE at [facility] and found to be 1-2cm dilated, as per report from [facility]. Pt reports wanting to come here for further eval instead of [facility], where she gets her prenatal care from. Pt reports feeling intermittent strong ctx for the last 3 days, every 15-30 mins lasting approx 2 mins, pain 8 out of 10 on pain scale. Pt also endorses loss of mucus plug. Denies lof, vb. Reports + FM    PNC at [facility] clinic, last appt 22. Pt states baby had a growth measurement 2 weeks ago "7lbs 14oz". Pt also reports failing her 1 hr glucose test, and when attempting the 3 hr she vomited the drink and did not complete test (did not repeat test). GBS reported negative (22)    AP course c/b cHTN, HIV+  NKDA  Current medications:  -Tivicay qd @ HS (last taken last night) ?unsure of dosage  -Descovy qd @ HS (last taken last night) ?unsure of dosage  -PNV  OBGYN history:  -h/o abnormal pap smears  -ovarian cysts  -h/o chlamydia years ago (treated)  Medical history:  -cHTN (took meds years ago, no cardiac f/u, denies any other cardiac history)  -HIV + (viral load from 22 undetectable, however was detectable in October - labs with CD4 count in [facility]), contracted age 13. Virology followed at [facility]  Surgical history:  -R inguinal hernia surgery as a child  -appendectomy  -LEEP procedure 2016  -bilateral myringotomy as child  Pysch history:  -When initially asked about history, pt states "that is irrelevant to this visit". Further into questioning, pt endorses h/o anxiety, depression, manic depression, and h/o suicidal attempts 3 years ago. Pt states feeling safe at home, and feels well at this current time. FOB not involved  Denies current alcohol, smoking and illicit drug use - [facility] + Cocaine and THC use in 2019
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
## 2023-01-01 NOTE — OB PROVIDER H&P - PROBLEM SELECTOR PLAN 1
no evidence of active labor  BPP reassuring, ice chips given for minimal variability on NST    NST improved s/p ice chips; category 1 FHT  pt desires induction of labor, c/o pain with contractions  d/w Dr. Mcintosh OB service attending and Dr. Storm chief OB resident  pt to be admitted for IOL for HIV+  induction agent to be determined on labor floor  AZT to be ordered for antiretroviral therapy during induction process  routine labor orders  Prenatal labs ordered. Pt signed medical release of information, called and faxed consent form to [facility] for prenatal records  Labor and delivery consent and blood transfusion consent obtained. Verbal consent obtained for urine toxicology  CMP, coags, UA ordered  glucose finger stick x1 on admission  HIV viral load and T cell panel ordered  COVID PCR obtained and sent no evidence of active labor  BPP reassuring, ice chips given for minimal variability on NST    NST improved s/p ice chips; category 1 FHT  pt desires induction of labor, c/o pain with contractions  d/w Dr. Mcintosh OB service attending and Dr. Marques chief OB resident  pt to be admitted for IOL for HIV+  induction agent to be determined on labor floor  AZT to be ordered for antiretroviral therapy during induction process  routine labor orders  Prenatal labs ordered. Pt signed medical release of information, called and faxed consent form to [facility] for prenatal records  Labor and delivery consent and blood transfusion consent obtained. Verbal consent obtained for urine toxicology  CMP, coags, UA ordered  glucose finger stick x1 on admission  HIV viral load and T cell panel ordered  COVID PCR obtained and sent

## 2023-01-01 NOTE — OB PROVIDER H&P - PROBLEM SELECTOR PLAN 4
no evidence of active labor  BPP reassuring, ice chips given for minimal variability on NST    NST improved s/p ice chips; category 1 FHT  pt desires induction of labor, c/o pain with contractions  d/w Dr. Mcintosh OB service attending and Dr. Marques chief OB resident  pt to be admitted for IOL for HIV+  induction agent to be determined on labor floor  AZT to be ordered for antiretroviral therapy during induction process  routine labor orders  Prenatal labs ordered. Pt signed medical release of information, called and faxed consent form to [facility] for prenatal records  Labor and delivery consent and blood transfusion consent obtained. Verbal consent obtained for urine toxicology  CMP, coags, UA ordered  glucose finger stick x1 on admission  HIV viral load and T cell panel ordered  COVID PCR obtained and sent

## 2023-01-01 NOTE — OB RN TRIAGE NOTE - MENTAL HEALTH CONDITIONS/SYMPTOMS, PROFILE
No treatment at present  Pt does not wish to talk about past/anxiety disorder/depression/manic behavior

## 2023-01-01 NOTE — OB PROVIDER TRIAGE NOTE - HISTORY OF PRESENT ILLNESS
33 y/o  at 39.6 weeks gestation transferred from [facility] in [place] via EMS for further evaluation in ruling out labor. SVE at [facility] and found to be 1-2cm dilated, as per report from [facility]. Pt reports wanting to come here for further eval instead of [facility], where she gets her prenatal care from. Pt reports feeling intermittent strong ctx for the last 3 days, every 15-30 mins lasting approx 2 mins, pain 8 out of 10 on pain scale. Pt also endorses loss of mucus plug. Denies lof, vb. Reports + FM    PNC at [facility] clinic, last appt 22. Pt states baby had a growth measurement 2 weeks ago "7lbs 14oz". Pt also reports failing her 1 hr glucose test, and when attempting the 3 hr she vomited the drink and did not complete test (did not repeat test). GBS reported negative    AP course c/b cHTN, HIV+  NKDA  Current medications:  -Tivicay qd @ HS (last taken last night)  -Descovy qd @ HS (last taken last night)  -PNV  OBGYN history:  -h/o abnormal pap smears  -ovarian cysts  -h/o chlamydia years ago (treated)  Medical history:  -cHTN (took meds years ago, no cardiac f/u)  -HIV + (pt reports load is undetectable, unknown when last viral load drawn), contracted age 13. Care followed at [facility]  Surgical history:  -R inguinal hernia surgery as a child  -appendectomy  -LEEP procedure 2016  -bilateral myringotomy as child  Pysch history:  -When initially asked about history, pt states "that is irrelevant to this visit". Further into questioning, pt endorses h/o anxiety, depression, manic depression, and h/o suicidal attempts 3 years ago. Pt states feeling safe at home, and feels well at this current time.  Denies current alcohol, smoking and illicit drug use 31 y/o  at 39.6 weeks gestation transferred from [facility] in [place] via EMS for further evaluation in ruling out labor. SVE at [facility] and found to be 1-2cm dilated, as per report from [facility]. Pt reports wanting to come here for further eval instead of [facility], where she gets her prenatal care from. Pt reports feeling intermittent strong ctx for the last 3 days, every 15-30 mins lasting approx 2 mins, pain 8 out of 10 on pain scale. Pt also endorses loss of mucus plug. Denies lof, vb. Reports + FM    PNC at [facility] clinic, last appt 22. Pt states baby had a growth measurement 2 weeks ago "7lbs 14oz". Pt also reports failing her 1 hr glucose test, and when attempting the 3 hr she vomited the drink and did not complete test (did not repeat test). GBS reported negative (22)    AP course c/b cHTN, HIV+  NKDA  Current medications:  -Tivicay qd @ HS (last taken last night)  -Descovy qd @ HS (last taken last night)  -PNV  OBGYN history:  -h/o abnormal pap smears  -ovarian cysts  -h/o chlamydia years ago (treated)  Medical history:  -cHTN (took meds years ago, no cardiac f/u)  -HIV + (viral load from 22 undetectable, however was detectable in October - labs with CD4 count in [facility]), contracted age 13. Care followed at [facility]  Surgical history:  -R inguinal hernia surgery as a child  -appendectomy  -LEEP procedure 2016  -bilateral myringotomy as child  Pysch history:  -When initially asked about history, pt states "that is irrelevant to this visit". Further into questioning, pt endorses h/o anxiety, depression, manic depression, and h/o suicidal attempts 3 years ago. Pt states feeling safe at home, and feels well at this current time. FOB not involved  Denies current alcohol, smoking and illicit drug use 31 y/o  at 39.6 weeks gestation transferred from [facility] in [place] via EMS for further evaluation in ruling out labor. SVE at [facility] and found to be 1-2cm dilated, as per report from [facility]. Pt reports wanting to come here for further eval instead of [facility], where she gets her prenatal care from. Pt reports feeling intermittent strong ctx for the last 3 days, every 15-30 mins lasting approx 2 mins, pain 8 out of 10 on pain scale. Pt also endorses loss of mucus plug. Denies lof, vb. Reports + FM    PNC at [facility] clinic, last appt 22. Pt states baby had a growth measurement 2 weeks ago "7lbs 14oz". Pt also reports failing her 1 hr glucose test, and when attempting the 3 hr she vomited the drink and did not complete test (did not repeat test). GBS reported negative (22)    AP course c/b cHTN, HIV+  NKDA  Current medications:  -Tivicay qd @ HS (last taken last night)  -Descovy qd @ HS (last taken last night)  -PNV  OBGYN history:  -h/o abnormal pap smears  -ovarian cysts  -h/o chlamydia years ago (treated)  Medical history:  -cHTN (took meds years ago, no cardiac f/u, denies other cardiac history)  -HIV + (viral load from 22 undetectable, however was detectable in October - labs with CD4 count in [facility]), contracted age 13. Care followed at [facility]  Surgical history:  -R inguinal hernia surgery as a child  -appendectomy  -LEEP procedure 2016  -bilateral myringotomy as child  Pysch history:  -When initially asked about history, pt states "that is irrelevant to this visit". Further into questioning, pt endorses h/o anxiety, depression, manic depression, and h/o suicidal attempts 3 years ago. Pt states feeling safe at home, and feels well at this current time. FOB not involved  Denies current alcohol, smoking and illicit drug use - [facility] + Cocaine and THC use in 2019

## 2023-01-01 NOTE — OB PROVIDER TRIAGE NOTE - NSOBPROVIDERNOTE_OBGYN_ALL_OB_FT
no evidence of active labor  BPP reassuring no evidence of active labor  BPP reassuring, ice chips given for minimal variability on NST    NST improved s/p ice chips; category 1 FHT  pt desires induction of labor, c/o pain with contractions  d/w Dr. Mcintosh OB service attending and Dr. Storm chief OB resident  pt to be admitted for IOL for HIV+  induction agent to be determined on labor floor  AZT to be ordered for antiretroviral therapy during induction process  routine labor orders  Prenatal labs ordered. Pt signed medical release of information, called and faxed consent form to [facility] for prenatal records  Labor and delivery consent and blood transfusion consent obtained. Verbal consent obtained for urine toxicology  CMP, coags, UA ordered  glucose finger stick x1 on admission  HIV viral load and T cell panel ordered  COVID PCR obtained and sent no evidence of active labor  BPP reassuring, ice chips given for minimal variability on NST    NST improved s/p ice chips; category 1 FHT  pt desires induction of labor, c/o pain with contractions  d/w Dr. Mcintosh OB service attending and Dr. Marques chief OB resident  pt to be admitted for IOL for HIV+  induction agent to be determined on labor floor  AZT to be ordered for antiretroviral therapy during induction process  routine labor orders  Prenatal labs ordered. Pt signed medical release of information, called and faxed consent form to [facility] for prenatal records  Labor and delivery consent and blood transfusion consent obtained. Verbal consent obtained for urine toxicology  CMP, coags, UA ordered  glucose finger stick x1 on admission  HIV viral load and T cell panel ordered  COVID PCR obtained and sent

## 2023-01-01 NOTE — OB RN TRIAGE NOTE - FALL HARM RISK - UNIVERSAL INTERVENTIONS
Bed in lowest position, wheels locked, appropriate side rails in place/Call bell, personal items and telephone in reach/Instruct patient to call for assistance before getting out of bed or chair/Non-slip footwear when patient is out of bed/[place] to call system/Physically safe environment - no spills, clutter or unnecessary equipment/Purposeful Proactive Rounding/Room/bathroom lighting operational, light cord in reach

## 2023-01-01 NOTE — OB RN PATIENT PROFILE - MENTAL HEALTH CONDITIONS/SYMPTOMS, PROFILE
Advance diet as instructed   No treatment at present  Pt does not wish to talk about past/anxiety disorder/depression/manic behavior No treatment at present  Pt does not wish to talk about past. Attempted suicide 3 years ago/anxiety disorder/depression/manic behavior/suicide attempt

## 2023-01-02 LAB
RUBV IGG SER-ACNC: 7.2 INDEX — SIGNIFICANT CHANGE UP
RUBV IGG SER-IMP: POSITIVE — SIGNIFICANT CHANGE UP
T PALLIDUM AB TITR SER: NEGATIVE — SIGNIFICANT CHANGE UP

## 2023-01-02 RX ORDER — SODIUM CHLORIDE 9 MG/ML
3 INJECTION INTRAMUSCULAR; INTRAVENOUS; SUBCUTANEOUS EVERY 8 HOURS
Refills: 0 | Status: DISCONTINUED | OUTPATIENT
Start: 2023-01-02 | End: 2023-01-04

## 2023-01-02 RX ORDER — MAGNESIUM HYDROXIDE 400 MG/1
30 TABLET, CHEWABLE ORAL
Refills: 0 | Status: DISCONTINUED | OUTPATIENT
Start: 2023-01-02 | End: 2023-01-04

## 2023-01-02 RX ORDER — OXYTOCIN 10 UNIT/ML
VIAL (ML) INJECTION
Qty: 30 | Refills: 0 | Status: DISCONTINUED | OUTPATIENT
Start: 2023-01-02 | End: 2023-01-02

## 2023-01-02 RX ORDER — SIMETHICONE 80 MG/1
80 TABLET, CHEWABLE ORAL EVERY 4 HOURS
Refills: 0 | Status: DISCONTINUED | OUTPATIENT
Start: 2023-01-02 | End: 2023-01-04

## 2023-01-02 RX ORDER — TETANUS TOXOID, REDUCED DIPHTHERIA TOXOID AND ACELLULAR PERTUSSIS VACCINE, ADSORBED 5; 2.5; 8; 8; 2.5 [IU]/.5ML; [IU]/.5ML; UG/.5ML; UG/.5ML; UG/.5ML
0.5 SUSPENSION INTRAMUSCULAR ONCE
Refills: 0 | Status: DISCONTINUED | OUTPATIENT
Start: 2023-01-02 | End: 2023-01-04

## 2023-01-02 RX ORDER — KETOROLAC TROMETHAMINE 30 MG/ML
30 SYRINGE (ML) INJECTION ONCE
Refills: 0 | Status: DISCONTINUED | OUTPATIENT
Start: 2023-01-02 | End: 2023-01-02

## 2023-01-02 RX ORDER — OXYCODONE HYDROCHLORIDE 5 MG/1
5 TABLET ORAL
Refills: 0 | Status: DISCONTINUED | OUTPATIENT
Start: 2023-01-02 | End: 2023-01-04

## 2023-01-02 RX ORDER — OXYCODONE HYDROCHLORIDE 5 MG/1
5 TABLET ORAL ONCE
Refills: 0 | Status: DISCONTINUED | OUTPATIENT
Start: 2023-01-02 | End: 2023-01-04

## 2023-01-02 RX ORDER — DIPHENHYDRAMINE HCL 50 MG
25 CAPSULE ORAL EVERY 6 HOURS
Refills: 0 | Status: DISCONTINUED | OUTPATIENT
Start: 2023-01-02 | End: 2023-01-04

## 2023-01-02 RX ORDER — IBUPROFEN 200 MG
600 TABLET ORAL EVERY 6 HOURS
Refills: 0 | Status: COMPLETED | OUTPATIENT
Start: 2023-01-02 | End: 2023-12-01

## 2023-01-02 RX ORDER — OXYTOCIN 10 UNIT/ML
41.67 VIAL (ML) INJECTION
Qty: 20 | Refills: 0 | Status: DISCONTINUED | OUTPATIENT
Start: 2023-01-02 | End: 2023-01-04

## 2023-01-02 RX ORDER — AER TRAVELER 0.5 G/1
1 SOLUTION RECTAL; TOPICAL EVERY 4 HOURS
Refills: 0 | Status: DISCONTINUED | OUTPATIENT
Start: 2023-01-02 | End: 2023-01-04

## 2023-01-02 RX ORDER — LANOLIN
1 OINTMENT (GRAM) TOPICAL EVERY 6 HOURS
Refills: 0 | Status: DISCONTINUED | OUTPATIENT
Start: 2023-01-02 | End: 2023-01-04

## 2023-01-02 RX ORDER — PRAMOXINE HYDROCHLORIDE 150 MG/15G
1 AEROSOL, FOAM RECTAL EVERY 4 HOURS
Refills: 0 | Status: DISCONTINUED | OUTPATIENT
Start: 2023-01-02 | End: 2023-01-04

## 2023-01-02 RX ORDER — IBUPROFEN 200 MG
600 TABLET ORAL EVERY 6 HOURS
Refills: 0 | Status: DISCONTINUED | OUTPATIENT
Start: 2023-01-02 | End: 2023-01-04

## 2023-01-02 RX ORDER — ONDANSETRON 8 MG/1
4 TABLET, FILM COATED ORAL ONCE
Refills: 0 | Status: DISCONTINUED | OUTPATIENT
Start: 2023-01-02 | End: 2023-01-04

## 2023-01-02 RX ORDER — DIBUCAINE 1 %
1 OINTMENT (GRAM) RECTAL EVERY 6 HOURS
Refills: 0 | Status: DISCONTINUED | OUTPATIENT
Start: 2023-01-02 | End: 2023-01-04

## 2023-01-02 RX ORDER — HYDROCORTISONE 1 %
1 OINTMENT (GRAM) TOPICAL EVERY 6 HOURS
Refills: 0 | Status: DISCONTINUED | OUTPATIENT
Start: 2023-01-02 | End: 2023-01-04

## 2023-01-02 RX ORDER — ACETAMINOPHEN 500 MG
975 TABLET ORAL
Refills: 0 | Status: DISCONTINUED | OUTPATIENT
Start: 2023-01-02 | End: 2023-01-04

## 2023-01-02 RX ORDER — BENZOCAINE 10 %
1 GEL (GRAM) MUCOUS MEMBRANE EVERY 6 HOURS
Refills: 0 | Status: DISCONTINUED | OUTPATIENT
Start: 2023-01-02 | End: 2023-01-04

## 2023-01-02 RX ADMIN — Medication 1000 MILLIUNIT(S)/MIN: at 14:01

## 2023-01-02 RX ADMIN — Medication 30 MILLIGRAM(S): at 15:22

## 2023-01-02 RX ADMIN — SODIUM CHLORIDE 3 MILLILITER(S): 9 INJECTION INTRAMUSCULAR; INTRAVENOUS; SUBCUTANEOUS at 21:21

## 2023-01-02 RX ADMIN — Medication 2 MILLIUNIT(S)/MIN: at 06:51

## 2023-01-02 RX ADMIN — Medication 30 MILLIGRAM(S): at 15:37

## 2023-01-02 RX ADMIN — SODIUM CHLORIDE 125 MILLILITER(S): 9 INJECTION, SOLUTION INTRAVENOUS at 10:00

## 2023-01-02 RX ADMIN — DOLUTEGRAVIR SODIUM 50 MILLIGRAM(S): 25 TABLET, FILM COATED ORAL at 21:20

## 2023-01-02 RX ADMIN — Medication 34.7 MG/KG/HR: at 09:52

## 2023-01-02 RX ADMIN — EMTRICITABINE AND TENOFOVIR DISOPROXIL FUMARATE 1 TABLET(S): 200; 300 TABLET, FILM COATED ORAL at 21:21

## 2023-01-02 RX ADMIN — FENTANYL CITRATE 30 MILLILITER(S): 50 INJECTION INTRAVENOUS at 00:27

## 2023-01-02 NOTE — OB PROVIDER LABOR PROGRESS NOTE - ASSESSMENT
A/P:   - Labor:  IOL for ID+ and cHTN, was on vaginal cytotec, plan to switch to PO due to contraction pattern. Patient repositioned and tracing resuscitated. Start PO when tracing improved. Patient intact.  - Fetus: cat 2, fetal status currently reassuring  - GBS: neg  - Pain: epi, pt comfortable    Anne Medina, PGY-1  
A/P:   - Labor:  at 39+6 IOL for extensive hx, first vaginal cytotec placed. Patient refusing a balloon at this time. Benefits of balloon explained to patient and she still refuses. Continue vaginal cytotec.  - Fetus: Cat 2  - GBS: negative  - Pain: Patient refusing epi for hx of MVI with multiple herniated discs, will call anesthesia for possible PCA    Anne Medina, PGY-1  
Patient with cervical change  Feels overall comfortable with epidural  c/w pitocin    d/w Dr. Paredes, Service Attending  KURTIS Lorenzo PGY1
Patient with cervical effacement since previous exam  For IV Tylenol    d/w TREVA Marques PGY4 and Dr. Dayna Lorenzo PGY1

## 2023-01-02 NOTE — CHART NOTE - NSCHARTNOTEFT_GEN_A_CORE
Given patient's extensive psychiatric and social history, Psychiatry consulted for behavioral safety assessment for patient and to evaluate for safety of allowing infant to remain in patient's care postpartum.  Per Dr. Lizarraga of Psychiatry, patient will not be able to be seen today, however consult will be seen tomorrow morning.  Recommended if there is any acute psychiatric or behavioral concerns for patient, Code CLAUDIO should be called.    d/w ABRIL Bansal PGY3 and Dr. Paredes, Service Attending  KURTIS Lorenzo PGY1

## 2023-01-02 NOTE — OB RN DELIVERY SUMMARY - BABYS CARE PROVIDER NAME, OB PROFILE
Dr. Padron Quality 130: Documentation Of Current Medications In The Medical Record: Current Medications Documented Detail Level: Detailed Quality 431: Preventive Care And Screening: Unhealthy Alcohol Use - Screening: Patient screened for unhealthy alcohol use using a single question and scores less than 2 times per year Quality 226: Preventive Care And Screening: Tobacco Use: Screening And Cessation Intervention: Patient screened for tobacco use and is an ex/non-smoker

## 2023-01-02 NOTE — OB PROVIDER DELIVERY SUMMARY - NSPROVIDERDELIVERYNOTE_OBGYN_ALL_OB_FT
Patient fully dilated and pushing.   of liveborn female infant. Head, shoulders and body delivered easily in ARIANA position. Cord noted around R foot.  Cord clamping delayed 60s.  Placenta delivered intact. Vaginal exam revealed bilateral vaginal sidewall abrasions that were hemostatic and did not require sutures. Excellent hemostasis. Count was correct x2.

## 2023-01-02 NOTE — OB PROVIDER LABOR PROGRESS NOTE - NS_OBIHIFHRDETAILS_OBGYN_ALL_OB_FT
135bpm, min, +accels, - decels
140bpm, min, +accels, - decels, discontinuous tracing
145/mod/occasional late and variable decelerations
140/mod/no accels, intermittent lates and variable decels

## 2023-01-02 NOTE — OB PROVIDER LABOR PROGRESS NOTE - NS_SUBJECTIVE/OBJECTIVE_OBGYN_ALL_OB_FT
Patient examined for cervical change due to complaint of increased pain   Patient has not yet started induction.  Patient requesting IV pain medication, declining epidural
R1 OB Labor Note    S: Patient seen and examined at bedside. She is feeling pain and pressure in the front. Vaginal cytotec placed.    T(C): 36.9 (01-01-23 @ 18:02), Max: 36.9 (01-01-23 @ 18:02)  HR: 88 (01-01-23 @ 20:01) (82 - 96)  BP: 111/66 (01-01-23 @ 20:01) (104/57 - 146/73)  RR: 17 (01-01-23 @ 18:02) (16 - 17)
Delayed documentation 2/2 to clinical duties  Patient examined for cervical change due to increased pelvic pressure
R1 OB Labor Note    S: MD called to bedside for decel and patient examined. Patient repositioned, given bolus to resuscitate tracing.    T(C): 36.9 (01-01-23 @ 18:02), Max: 36.9 (01-01-23 @ 18:02)  HR: 105 (01-02-23 @ 02:49) (77 - 112)  BP: 108/65 (01-02-23 @ 02:36) (104/57 - 146/73)  RR: 17 (01-01-23 @ 18:02) (16 - 17)  SpO2: 100% (01-02-23 @ 02:44) (89% - 100%)

## 2023-01-02 NOTE — OB PROVIDER DELIVERY SUMMARY - NSSELHIDDEN_OBGYN_ALL_OB_FT
[NS_DeliveryAttending1_OBGYN_ALL_OB_FT:[id number]],[NS_DeliveryAssist1_OBGYN_ALL_OB_FT:[id number]],[NS_DeliveryRN_OBGYN_ALL_OB_FT:[id number]]

## 2023-01-02 NOTE — OB PROVIDER DELIVERY SUMMARY - NSLOWPPHRISK_OBGYN_A_OB
No previous uterine incision/[place] Pregnancy/No known bleeding disorder/No history of postpartum hemorrhage/No other PPH risks indicated

## 2023-01-02 NOTE — OB RN DELIVERY SUMMARY - NS_SEPSISRSKCALC_OBGYN_ALL_OB_FT
EOS calculated successfully. EOS Risk Factor: 0.5/1000 live births ([facility] national incidence); GA=40w;Temp=98.42; ROM=0.817; GBS='Negative'; Antibiotics='No antibiotics or any antibiotics < 2 hrs prior to birth'

## 2023-01-02 NOTE — OB RN DELIVERY SUMMARY - NSSELHIDDEN_OBGYN_ALL_OB_FT
[NS_DeliveryAttending1_OBGYN_ALL_OB_FT:[id number]],[NS_DeliveryAssist1_OBGYN_ALL_OB_FT:[id number]],[NS_DeliveryRN_OBGYN_ALL_OB_FT:[id number]]

## 2023-01-03 ENCOUNTER — TRANSCRIPTION ENCOUNTER (OUTPATIENT)
Age: 33
End: 2023-01-03

## 2023-01-03 LAB
4/8 RATIO: 0.37 RATIO — LOW (ref 0.9–3.6)
ABS CD8: 605 CELLS/UL — SIGNIFICANT CHANGE UP (ref 142–740)
BASOPHILS # BLD AUTO: 0.03 K/UL — SIGNIFICANT CHANGE UP (ref 0–0.2)
BASOPHILS NFR BLD AUTO: 0.2 % — SIGNIFICANT CHANGE UP (ref 0–2)
CD16+CD56+ CELLS NFR BLD: 9 % — SIGNIFICANT CHANGE UP (ref 5–23)
CD16+CD56+ CELLS NFR SPEC: 133 CELLS/UL — SIGNIFICANT CHANGE UP (ref 71–410)
CD19 BLASTS SPEC-ACNC: 29 % — HIGH (ref 6–24)
CD19 BLASTS SPEC-ACNC: 407 CELLS/UL — SIGNIFICANT CHANGE UP (ref 84–469)
CD3 BLASTS SPEC-ACNC: 61 % — SIGNIFICANT CHANGE UP (ref 59–83)
CD3 BLASTS SPEC-ACNC: 858 CELLS/UL — SIGNIFICANT CHANGE UP (ref 672–1870)
CD4 %: 16 % — LOW (ref 30–62)
CD8 %: 43 % — HIGH (ref 12–36)
EOSINOPHIL # BLD AUTO: 0.05 K/UL — SIGNIFICANT CHANGE UP (ref 0–0.5)
EOSINOPHIL NFR BLD AUTO: 0.3 % — SIGNIFICANT CHANGE UP (ref 0–6)
HCT VFR BLD CALC: 29.4 % — LOW (ref 34.5–45)
HGB BLD-MCNC: 9.6 G/DL — LOW (ref 11.5–15.5)
HIV-1 VIRAL LOAD RESULT: ABNORMAL
HIV1 RNA # SERPL NAA+PROBE: ABNORMAL COPIES/ML
HIV1 RNA SER-IMP: SIGNIFICANT CHANGE UP
HIV1 RNA SERPL NAA+PROBE-ACNC: ABNORMAL
HIV1 RNA SERPL NAA+PROBE-LOG#: ABNORMAL LG COP/ML
HIV1+2 AB SPEC QL: ABNORMAL
HIV1+2 AB SPEC QL: SIGNIFICANT CHANGE UP
IANC: 12.22 K/UL — HIGH (ref 1.8–7.4)
IMM GRANULOCYTES NFR BLD AUTO: 0.9 % — SIGNIFICANT CHANGE UP (ref 0–0.9)
LYMPHOCYTES # BLD AUTO: 1.69 K/UL — SIGNIFICANT CHANGE UP (ref 1–3.3)
LYMPHOCYTES # BLD AUTO: 11.1 % — LOW (ref 13–44)
MCHC RBC-ENTMCNC: 30.1 PG — SIGNIFICANT CHANGE UP (ref 27–34)
MCHC RBC-ENTMCNC: 32.7 GM/DL — SIGNIFICANT CHANGE UP (ref 32–36)
MCV RBC AUTO: 92.2 FL — SIGNIFICANT CHANGE UP (ref 80–100)
MONOCYTES # BLD AUTO: 1.05 K/UL — HIGH (ref 0–0.9)
MONOCYTES NFR BLD AUTO: 6.9 % — SIGNIFICANT CHANGE UP (ref 2–14)
NEUTROPHILS # BLD AUTO: 12.22 K/UL — HIGH (ref 1.8–7.4)
NEUTROPHILS NFR BLD AUTO: 80.6 % — HIGH (ref 43–77)
NRBC # BLD: 0 /100 WBCS — SIGNIFICANT CHANGE UP (ref 0–0)
NRBC # FLD: 0 K/UL — SIGNIFICANT CHANGE UP (ref 0–0)
PLATELET # BLD AUTO: 186 K/UL — SIGNIFICANT CHANGE UP (ref 150–400)
RBC # BLD: 3.19 M/UL — LOW (ref 3.8–5.2)
RBC # FLD: 13.2 % — SIGNIFICANT CHANGE UP (ref 10.3–14.5)
T-CELL CD4 SUBSET PNL BLD: 227 CELLS/UL — LOW (ref 489–1457)
WBC # BLD: 15.17 K/UL — HIGH (ref 3.8–10.5)
WBC # FLD AUTO: 15.17 K/UL — HIGH (ref 3.8–10.5)

## 2023-01-03 PROCEDURE — 99254 IP/OBS CNSLTJ NEW/EST MOD 60: CPT | Mod: GC

## 2023-01-03 PROCEDURE — 99232 SBSQ HOSP IP/OBS MODERATE 35: CPT

## 2023-01-03 PROCEDURE — 59409 OBSTETRICAL CARE: CPT | Mod: U9,UB,GC

## 2023-01-03 RX ADMIN — Medication 600 MILLIGRAM(S): at 01:31

## 2023-01-03 RX ADMIN — Medication 1 TABLET(S): at 10:53

## 2023-01-03 RX ADMIN — SODIUM CHLORIDE 3 MILLILITER(S): 9 INJECTION INTRAMUSCULAR; INTRAVENOUS; SUBCUTANEOUS at 05:43

## 2023-01-03 RX ADMIN — EMTRICITABINE AND TENOFOVIR DISOPROXIL FUMARATE 1 TABLET(S): 200; 300 TABLET, FILM COATED ORAL at 20:00

## 2023-01-03 RX ADMIN — Medication 975 MILLIGRAM(S): at 20:30

## 2023-01-03 RX ADMIN — Medication 975 MILLIGRAM(S): at 13:51

## 2023-01-03 RX ADMIN — SODIUM CHLORIDE 3 MILLILITER(S): 9 INJECTION INTRAMUSCULAR; INTRAVENOUS; SUBCUTANEOUS at 20:00

## 2023-01-03 RX ADMIN — Medication 975 MILLIGRAM(S): at 19:59

## 2023-01-03 RX ADMIN — Medication 600 MILLIGRAM(S): at 11:52

## 2023-01-03 RX ADMIN — Medication 975 MILLIGRAM(S): at 03:15

## 2023-01-03 RX ADMIN — Medication 600 MILLIGRAM(S): at 10:52

## 2023-01-03 RX ADMIN — Medication 600 MILLIGRAM(S): at 05:43

## 2023-01-03 RX ADMIN — Medication 600 MILLIGRAM(S): at 00:53

## 2023-01-03 RX ADMIN — Medication 975 MILLIGRAM(S): at 02:45

## 2023-01-03 RX ADMIN — Medication 600 MILLIGRAM(S): at 18:00

## 2023-01-03 RX ADMIN — MAGNESIUM HYDROXIDE 30 MILLILITER(S): 400 TABLET, CHEWABLE ORAL at 18:45

## 2023-01-03 RX ADMIN — Medication 975 MILLIGRAM(S): at 14:51

## 2023-01-03 RX ADMIN — Medication 600 MILLIGRAM(S): at 23:41

## 2023-01-03 RX ADMIN — Medication 975 MILLIGRAM(S): at 07:55

## 2023-01-03 RX ADMIN — Medication 1 TABLET(S): at 14:10

## 2023-01-03 RX ADMIN — Medication 600 MILLIGRAM(S): at 06:15

## 2023-01-03 RX ADMIN — Medication 600 MILLIGRAM(S): at 17:00

## 2023-01-03 RX ADMIN — DOLUTEGRAVIR SODIUM 50 MILLIGRAM(S): 25 TABLET, FILM COATED ORAL at 19:59

## 2023-01-03 RX ADMIN — Medication 975 MILLIGRAM(S): at 08:55

## 2023-01-03 NOTE — DISCHARGE NOTE OB - CARE PROVIDERS DIRECT ADDRESSES
,[email or web address],DirectAddress_Unknown

## 2023-01-03 NOTE — DISCHARGE NOTE OB - PLAN OF CARE
After discharge, please stay on pelvic rest for 6 weeks, meaning no sexual intercourse, no tampons and no douching.  No driving for 2 weeks as women can loose a lot of blood during delivery and there is a possibility of being lightheaded/fainting.  No lifting objects heavier than baby for two weeks.  Expect to have vaginal bleeding/spotting for up to six weeks.  The bleeding should get lighter and more white/light brown with time.  For bleeding soaking more than a pad an hour or passing clots greater than the size of your fist, come in to the emergency department.    Follow up with [facility] in 6 weeks. Please continue taking your antiretroviral medications as prescribed.  Continue taking the Bactrim daily.  Continue bottle feeding. Please obtain a blood pressure cuff. A prescription was sent to your pharmacy. Take your blood pressure two times a day at the same time. Call your doctor if your blood pressure is greater than 140 systolic (top number) and/or 90 diastolic (bottom number). Please go to the hospital if the systolic (top number) is 160 or greater and/or if the diastolic (bottom number) is 110 or greater. Follow-up with your Ob/Gyn in 2-3 days for a blood pressure check    Call your doctor and/or go the hospital if you experience a headache not relieved by medication, severe abdominal pain, new onset changes in vision, worsening of lower extremity swelling or edema. After discharge, please stay on pelvic rest for 6 weeks, meaning no sexual intercourse, no tampons and no douching.  No driving for 2 weeks as women can loose a lot of blood during delivery and there is a possibility of being lightheaded/fainting.  No lifting objects heavier than baby for two weeks.  Expect to have vaginal bleeding/spotting for up to six weeks.  The bleeding should get lighter and more white/light brown with time.  For bleeding soaking more than a pad an hour or passing clots greater than the size of your fist, come in to the emergency department.    Follow up in clinic in 6 weeks. Please present to the emergency room immediately with any thoughts of harming yourself or others.  Please call [facility] ([phone]) or  clinic ([phone]) to schedule an appointment outpatient or if you are in crisis and in need of help.

## 2023-01-03 NOTE — DISCHARGE NOTE OB - COMMUNITY RESOURCE NAME:
Patient instructed to make a follow up appointment at [facility], [phone] for 4-6 weeks from her delivery date. Patient also instructed to make a follow up appointment for the baby at [facility], Division of General Pediatrics, [phone] for 1-2 days after discharge.

## 2023-01-03 NOTE — CONSULT NOTE ADULT - SUBJECTIVE AND OBJECTIVE BOX
31 yo F s/p  at 39 weeks gestation yesterday. Patient reports a PMH of HTN when she was [age] years old, took HTN medications for "some time" and self discontinued years ago. Throughout this pregnancy, she has had no issues with her BP, she had mild leg swelling and shortness of breath which she attributes to the pregnancy. She currently denies any shortness of breath, chest discomfort, palpitations. Of note, she has a PMH of HIV and a psychiatric history.        O:  T(C): 36.7 (23 @ 14:00), Max: 37 (23 @ 18:41)  HR: 88 (23 @ 14:00) (82 - 105)  BP: 103/58 (23 @ 14:00) (103/58 - 124/64)  RR: 18 (23 @ 14:00) (18 - 18)  SpO2: 100% (23 @ 14:00) (96% - 100%)  EKG: NSR    O/E:  Gen: NAD  HEENT: EOMI  CV: RRR, normal S1 + S2, no m/r/g  Lungs: CTAB  Ext: trace edema bilateral feet    Labs:                        9.6    15.17 )-----------( 186      ( 2023 06:12 )             29.4     Meds:  MEDICATIONS  (STANDING):  acetaminophen     Tablet .. 975 milliGRAM(s) Oral <User Schedule>  diphtheria/tetanus/pertussis (acellular) Vaccine (Adacel) 0.5 milliLiter(s) IntraMuscular once  dolutegravir 50 milliGRAM(s) Oral daily  emtricitabine 200 mG/tenofovir alafenamide 25 mG (DESCOVY) Tablet 1 Tablet(s) Oral daily  ibuprofen  Tablet. 600 milliGRAM(s) Oral every 6 hours  ondansetron Injectable 4 milliGRAM(s) IV Push once  oxytocin Infusion 41.667 milliUNIT(s)/Min (125 mL/Hr) IV Continuous <Continuous>  prenatal multivitamin 1 Tablet(s) Oral daily  sodium chloride 0.9% lock flush 3 milliLiter(s) IV Push every 8 hours              
INFECTIOUS DISEASE CONSULT NOTE    Patient is a 32y old  Female who presents with a chief complaint of   HPI:  31 y/o  at 39.6 weeks gestation transferred from [facility] in [place] via EMS for further evaluation in ruling out labor. SVE at [facility] and found to be 1-2cm dilated, as per report from [facility]. Pt reports wanting to come here for further eval instead of [facility], where she gets her prenatal care from. Pt reports feeling intermittent strong ctx for the last 3 days, every 15-30 mins lasting approx 2 mins, pain 8 out of 10 on pain scale. Pt also endorses loss of mucus plug. Denies lof, vb. Reports + FM    PNC at [facility] clinic, last appt 22. Pt states baby had a growth measurement 2 weeks ago "7lbs 14oz". Pt also reports failing her 1 hr glucose test, and when attempting the 3 hr she vomited the drink and did not complete test (did not repeat test). GBS reported negative (22)    AP course c/b cHTN, HIV+  NKDA  Current medications:  -Tivicay qd @ HS (last taken last night) ?unsure of dosage  -Descovy qd @ HS (last taken last night) ?unsure of dosage  -PNV  OBGYN history:  -h/o abnormal pap smears  -ovarian cysts  -h/o chlamydia years ago (treated)  Medical history:  -cHTN (took meds years ago, no cardiac f/u, denies any other cardiac history)  -HIV + (viral load from 22 undetectable, however was detectable in October - labs with CD4 count in [facility]), contracted age 13. Virology followed at [facility]  Surgical history:  -R inguinal hernia surgery as a child  -appendectomy  -LEEP procedure 2016  -bilateral myringotomy as child  Pysch history:  -When initially asked about history, pt states "that is irrelevant to this visit". Further into questioning, pt endorses h/o anxiety, depression, manic depression, and h/o suicidal attempts 3 years ago. Pt states feeling safe at home, and feels well at this current time. FOB not involved  Denies current alcohol, smoking and illicit drug use - [facility] + Cocaine and THC use in 2019 (2023 13:38)         Prior hospital charts reviewed [Yes]  Primary team notes reviewed [Yes]  Other consultant notes reviewed [Yes]    REVIEW OF SYSTEMS:  CONSTITUTIONAL: No fever or chills  HEENT: No sore throat  RESPIRATORY: No cough, no shortness of breath  CARDIOVASCULAR: No chest pain or palpitations  GASTROINTESTINAL: No abdominal or epigastric pain  GENITOURINARY: No dysuria  NEUROLOGICAL: No headache/dizziness  MSK: No joint pain, erythema, or swelling; no back pain  SKIN: No itching, rashes  All other ROS negative except noted above    PAST MEDICAL & SURGICAL HISTORY:  HIV (human immunodeficiency virus infection)      Chronic hypertension      Bilateral ovarian cysts      S/P appendectomy      S/P inguinal hernia repair      H/O LEEP      S/p bilateral myringotomy with tube placement          SOCIAL HISTORY:  - No recent travel  - Denies tobacco use  - Denies alcohol use  - Denies illicit drug use  - Currently not sexually active    FAMILY HISTORY:  No family history of HIV    Allergies:  No Known Allergies      ANTIMICROBIALS:  dolutegravir 50 daily  emtricitabine 200 mG/tenofovir alafenamide 25 mG (DESCOVY) Tablet 1 daily  trimethoprim  160 mG/sulfamethoxazole 800 mG 1 two times a day  zidovudine Infusion 1 <Continuous>      ANTIMICROBIALS (past 90 days):  MEDICATIONS  (STANDING):  dolutegravir   50 milliGRAM(s) Oral (23 @ 21:20)   50 milliGRAM(s) Oral (23 @ 20:58)    emtricitabine 200 mG/tenofovir alafenamide 25 mG (DESCOVY) Tablet   1 Tablet(s) Oral (23 @ 21:21)   1 Tablet(s) Oral (23 @ 20:57)    trimethoprim  160 mG/sulfamethoxazole 800 mG   1 Tablet(s) Oral (23 @ 10:53)    zidovudine Infusion   34.7 mL/Hr IV Continuous (23 @ 18:30)   34.7 mL/Hr IV Continuous (23 @ 13:33)    zidovudine IVPB   150 mL/Hr IV Intermittent (23 @ 17:25)        OTHER MEDS:   MEDICATIONS  (STANDING):  acetaminophen     Tablet .. 975 <User Schedule>  diphenhydrAMINE 25 every 6 hours PRN  diphtheria/tetanus/pertussis (acellular) Vaccine (Adacel) 0.5 once  ibuprofen  Tablet. 600 every 6 hours  magnesium hydroxide Suspension 30 two times a day PRN  ondansetron Injectable 4 once  oxyCODONE    IR 5 every 3 hours PRN  oxyCODONE    IR 5 once PRN  oxytocin Infusion 41.667 <Continuous>  simethicone 80 every 4 hours PRN      VITALS:  Vital Signs Last 24 Hrs  T(F): 98 (23 @ 14:00), Max: 98.9 (23 @ 16:33)    Vital Signs Last 24 Hrs  HR: 88 (23 @ 14:00) (82 - 105)  BP: 103/58 (23 @ 14:00) (103/58 - 124/64)  RR: 18 (23 @ 14:00)  SpO2: 100% (23 @ 14:00) (96% - 100%)  Wt(kg): --    EXAM:  GENERAL: NAD, lying in bed  HEAD: No head lesions  EYES: Conjunctiva pink and cornea white  ENT: Normal external ears and nose, no discharges; moist mucous membranes  NECK: Supple, nontender to palpation; no JVD  CHEST/LUNG: Clear to auscultation bilaterally  HEART: S1 S2  ABDOMEN: Gravid abdomen, slightly tender to touch  EXTREMITIES: No clubbing, cyanosis, or petal edema  NERVOUS SYSTEM: Alert and oriented to person, time, place and situation, speech clear. No focal deficits   MSK: No joint erythema, swelling or pain  SKIN: No rashes or lesions, no superficial thrombophlebitis  PSYCH: Normal affect    Labs:                        9.6    15.17 )-----------( 186      ( 2023 06:12 )             29.4             WBC Trend:  WBC Count: 15.17 (23 @ 06:12)  WBC Count: 11.75 (23 @ 13:00)      Auto Neutrophil #: 12.22 K/uL (23 @ 06:12)  Auto Neutrophil #: 9.60 K/uL (23 @ 13:00)  Auto Neutrophil #: 6.97 K/uL (22 @ 22:39)      Creatine Trend:  Creatinine, Serum: 0.54 ()      Liver Biochemical Testing Trend:  Alanine Aminotransferase (ALT/SGPT): 11 ()  Alanine Aminotransferase (ALT/SGPT): 9 ()  Aspartate Aminotransferase (AST/SGOT): 17 (23 @ 13:00)  Aspartate Aminotransferase (AST/SGOT): 17 (22 @ 22:39)  Bilirubin Total, Serum: 0.3 ()  Bilirubin Total, Serum: <0.2 ()      Trend LDH  23 @ 13:00  179      Auto Eosinophil %: 0.3 % (23 @ 06:12)  Auto Eosinophil %: 0.5 % (23 @ 13:00)          MICROBIOLOGY:              HIV-1/2 Combo Result: Prelim Reactive The HIV Ag/Ab Combo test performed screens for HIV-1 p24 antigen,  antibodies to HIV-1 (group M and group O), and antibodies to HIV-2. All  specimens repeatedly reactive will reflex to an HIV 1/2 antibody  confirmation and differentiation test. This assay detects p24 antigen  which may be present prior to the development of HIV antibodies,  therefore a reactive result with a negative HIV 1/2 AB Confirmation  should be followed up with HIV-1 RNA, HIV-2 RNA and repeat testing in 4-8  weeks. A nonreactive result does not preclude previous exposure to or  infection with HIV-1 or HIV-2. (23 @ 13:25)    ABS CD4: 227 cells/uL (23 @ 13:27)  ABS CD4: 16 /uL (01-10-20 @ 17:33)    HIV-1 RNA Quantitative, Viral Load: DET. <30 copies/mL (23 @ 13:00)  HIV-1 Viral Load Result: DET. (23 @ 13:00)  HIV-1 RNA Quantitative, Viral Load: DET. <30 (01-10-20 @ 18:00)  HIV-1 Viral Load Result: (01-10-20 @ 18:00)    Treponema Pallidum Antibody Interpretation: Negative (23 @ 13:00)      COVID-19 PCR: NotDetec (23 @ 14:30)    COVID-19 Alexander Domain AB Interp: Positive (23 @ 13:00)      Lactate Dehydrogenase, Serum: 179 ()      RADIOLOGY:    None ordered

## 2023-01-03 NOTE — CONSULT NOTE ADULT - ASSESSMENT
37 yo F with reported PMH of HTN s/p  yesterday    --Normotensive, no active cardiac symptoms. EKG reviewed, normal sinus rhythm. No urgent need for inpatient TTE. At this time, no active cardiac issues to investigate, patient advised to follow up with her PCP after discharge for continued care.
31 y/o  at 39.6 weeks gestation transferred from [facility] in [place] via EMS for active labor.    ID is consulted for HIV management  Patient had vaginal delivery on   Patient had uncontrolled viral load in 2022 due to noncompliance, started taking Descovy and Tivicay after getting pregnant  CD4 improved to 117/10% in 2022, VL undetectable 2022; again undetectable on 2023  Received AZT intra-partum  Mild leukocytosis after delivery  UA 26WBC, no bacteria  Tox screen negative  Syphilis screen negative    Due to CD4 < 200, would restart PO bactrim for PJP and Toxo ppx  No need for further AZT doses after delivery  Continue current regimen and follow up with [facility] ID clinic      IMPRESSION:  HIV  Active labor s/p vaginal delivery  Leukocytosis    RECOMMENDATIONS:  - Continue PO Descovy and Tivicay  - D/C zidovudine  - Decrease PO Bactrim to SS 1 tablet q24hrs  - Follow up outpatient with [facility] ID clinica  - Trend WBC to normal  - Will signs off. Please recall if there are any questions.      Patient is seen and examined with attending and case is discussed with primary team      Madeline Best D.O.  PGY-5 Infectious Diseases Fellow  Please contact me via page or text through [employer] Teams  If after 5PM or on weekends, please call [phone]

## 2023-01-03 NOTE — CHART NOTE - NSCHARTNOTEFT_GEN_A_CORE
Patient seen at bedside with Dr. Wright to discuss psychiatric consult. Patient previously declined consult. Explained reason for consultation is due to patient's extensive psychiatric history, including inpatient psychiatric hospitalization, and need for safe disposition of new infant. After much counseling, patient continues to decline consultation and is asking to speak to HR. Plan to contact patient experience to facilitate in adequate treatment of patient.     KURTIS Lorenzo, PGY-1  seen and counseled with Dr. Wright

## 2023-01-03 NOTE — PROGRESS NOTE ADULT - ASSESSMENT
31y/o  PPD#1 from .  PMH significant for AIDS, cHTN, and multiple psychiatric conditions. Patient is stable and progressing appropriately postpartum.    #AIDS  - Most recent CD4 count in patient record 188 from   - Received Zidyuvidine during labor  - On home Tivicay and Descovy  - Bactrim ordered for ppx  - Repeat CD4 this am  - ID consulted  - Patient bottle feeding infant    #cHTN  - awaiting TTE  - EKG NSR  - Cardio OB consult  - BP overnight: 110-120/50-70s  - Not on any standing antihypertensives      #Postpartum  - Continue with po analgesia  - Increase ambulation  - Continue regular diet  - IV lock  - No labs    **INCOMPLETE NOTE  Mariluz Lorenzo, PGY1 31y/o  PPD#1 from .  PMH significant for AIDS, cHTN, and multiple psychiatric conditions. Patient is stable and progressing appropriately postpartum.    #AIDS  - Most recent CD4 count in patient record 188 from   - Received Zidyuvidine during labor  - On home Tivicay and Descovy  - Bactrim ordered for ppx  - Repeat CD4 this am  - ID consulted  - Patient bottle feeding infant    #cHTN  - awaiting TTE  - EKG NSR  - Cardio OB consult  - BP overnight: 110-120/50-70s  - Not on any standing antihypertensives    #Psychiatric history  - Patient with history of     #Postpartum  - Continue with po analgesia  - Increase ambulation  - Continue regular diet  - IV lock  - No labs    **INCOMPLETE NOTE  Mariluz Lorenzo, PGY1 31y/o  PPD#1 from .  PMH significant for AIDS, cHTN, and multiple psychiatric conditions. Patient is stable and progressing appropriately postpartum.    #AIDS  - Most recent CD4 count in patient record 188 from   - Received Zidyuvidine during labor  - On home Tivicay and Descovy  - Bactrim ordered for ppx  - Repeat CD4 this am  - ID consulted  - Patient bottle feeding infant, advised tight bra, cool packs, and limiting nipple stimulation to assist with decreasing breast milk production    #cHTN  - awaiting TTE  - EKG NSR  - Cardio OB consult  - BP overnight: 110-120/50-70s  - Not on any standing antihypertensives    #Psychiatric history  - Patient with extensive psychiatric history including anxiety, depression, schizophrenia, bipolar disorder, and history of suicide attempt with inpatient psychiatric hospitalization  - Psychiatry consulted due to patient history and to evaluate for safe disposition of infant, patient refusing to speak with psychiatric team    #Postpartum  - Continue with po analgesia  - Increase ambulation  - Continue regular diet  - Desires to follow up with [facility] for postpartum care  - Counseled on postpartum contraception, declining contraception at this time    Mariluz Lorenzo, PGY1

## 2023-01-03 NOTE — CHART NOTE - NSCHARTNOTEFT_GEN_A_CORE
Psychiatry was consulted for recommendations because pt has an extensive psychiatric history. Pt refused to speak with team. Psychiatry team spoke with primary team (resident, attending, nursing) about pt, discussed that there was no acute psychiatric concern for the consult including no SI, HI, psychosis, violence, or agitation and that the team has seen the pt bonding well with the baby. Pt had consistently told team that she did not want to speak with psychiatry, and team discussed that we can be reconsulted if any acute psychiatric concern arises. If pt is amenable, primary team can provide pt with outpatient information: the walk-in crisis center at [facility] ([phone]) and  clinic ([phone]). Psychiatry was consulted for recommendations because pt has an extensive psychiatric history. Chart reviewed. Went to evaluate the patient, pt. was alert, calm however she refused to speak with psychiatry team. Psychiatry team spoke with primary team in person, (resident, attending, nursing) about pt, as per primary team,  there is no acute psychiatric concern for the consult including no SI, HI, psychosis, violence, or agitation and that the team has seen the pt bonding well with the baby. Pt had consistently told team that she did not want to speak with psychiatry, and team discussed that we can be reconsulted if any acute psychiatric concern arises. If pt is amenable, primary team can provide pt with outpatient information: the walk-in crisis center at [facility] ([phone]) and  clinic ([phone]).      Attending addendum:  Went to see the patient, pt. calm however she refused to speak with the psychiatry team.  Please don't hesitate to reconsult at #[phone], if any acute psychiatric concern arises.  Case d/w with he primary team in person.

## 2023-01-03 NOTE — PROGRESS NOTE ADULT - SUBJECTIVE AND OBJECTIVE BOX
R1 Progress Note    Patient seen and examined at bedside, no acute overnight events. No acute complaints, pain well controlled. Patient is ambulating, voiding spontaneously, passing gas, and tolerating regular diet. Denies CP, SOB, N/V, HA, blurred vision, epigastric pain.    Vital Signs Last 24 Hours  T(C): 36.5 (01-03-23 @ 06:41), Max: 37.2 (01-02-23 @ 16:33)  HR: 82 (01-03-23 @ 06:41) (82 - 133)  BP: 112/72 (01-03-23 @ 06:41) (103/56 - 175/78)  RR: 18 (01-03-23 @ 06:41) (14 - 18)  SpO2: 100% (01-03-23 @ 06:41) (64% - 100%)    Physical Exam:  General: NAD  Abdomen: Soft, appropriately tender, non-distended, fundus firm  Pelvic: Lochia wnl    Labs:    Blood Type: O Positive  Antibody Screen: Negative  RPR: Negative               9.6    15.17 )-----------( 186      ( 01-03 @ 06:12 )             29.4                11.9   11.75 )-----------( 223      ( 01-01 @ 13:00 )             36.4         MEDICATIONS  (STANDING):  acetaminophen     Tablet .. 975 milliGRAM(s) Oral <User Schedule>  diphtheria/tetanus/pertussis (acellular) Vaccine (Adacel) 0.5 milliLiter(s) IntraMuscular once  dolutegravir 50 milliGRAM(s) Oral daily  emtricitabine 200 mG/tenofovir alafenamide 25 mG (DESCOVY) Tablet 1 Tablet(s) Oral daily  ibuprofen  Tablet. 600 milliGRAM(s) Oral every 6 hours  ondansetron Injectable 4 milliGRAM(s) IV Push once  oxytocin Infusion 41.667 milliUNIT(s)/Min (125 mL/Hr) IV Continuous <Continuous>  prenatal multivitamin 1 Tablet(s) Oral daily  sodium chloride 0.9% lock flush 3 milliLiter(s) IV Push every 8 hours  trimethoprim  160 mG/sulfamethoxazole 800 mG 1 Tablet(s) Oral two times a day  zidovudine Infusion 1 mG/kG/Hr (34.7 mL/Hr) IV Continuous <Continuous>    MEDICATIONS  (PRN):  benzocaine 20%/menthol 0.5% Spray 1 Spray(s) Topical every 6 hours PRN for Perineal discomfort  dibucaine 1% Ointment 1 Application(s) Topical every 6 hours PRN Perineal discomfort  diphenhydrAMINE 25 milliGRAM(s) Oral every 6 hours PRN Pruritus  hydrocortisone 1% Cream 1 Application(s) Topical every 6 hours PRN Moderate Pain (4-6)  lanolin Ointment 1 Application(s) Topical every 6 hours PRN nipple soreness  magnesium hydroxide Suspension 30 milliLiter(s) Oral two times a day PRN Constipation  oxyCODONE    IR 5 milliGRAM(s) Oral every 3 hours PRN Moderate to Severe Pain (4-10)  oxyCODONE    IR 5 milliGRAM(s) Oral once PRN Moderate to Severe Pain (4-10)  pramoxine 1%/zinc 5% Cream 1 Application(s) Topical every 4 hours PRN Moderate Pain (4-6)  simethicone 80 milliGRAM(s) Chew every 4 hours PRN Gas  witch hazel Pads 1 Application(s) Topical every 4 hours PRN Perineal discomfort

## 2023-01-03 NOTE — DISCHARGE NOTE OB - PROVIDER TOKENS
PROVIDER:[TOKEN:[[id number]]] PROVIDER:[TOKEN:[[id number]]],FREE:[LAST:[[facility]],PHONE:[phone]],FAX:[(   )    -],ADDRESS:[[address] [address]],FOLLOWUP:[1-3 days]] PROVIDER:[TOKEN:[[id number]]],FREE:[LAST:[[facility]],PHONE:[(   )    -],FAX:[(   )    -],ADDRESS:[[facility]  Ambulatory Care Unit  Oncology Building, Level C  [address] [place] [address]  [phone]],FOLLOWUP:[1-3 days]]

## 2023-01-03 NOTE — CHART NOTE - NSCHARTNOTEFT_GEN_A_CORE
Spoke with RN regarding plan of care.  Per RN Mary, patient Spoke with RN regarding plan of care.  Per RN Mary, patient refusing to go to ultrasound suite for TTE, as she does not want to separate from infant.  RN states that she contacted echo technician and bedside TTE unable to be performed until tomorrow    d/w Dr. Kyle Lorenzo PGY1

## 2023-01-03 NOTE — DISCHARGE NOTE OB - NS MD DC FALL RISK RISK
For information on Fall & Injury Prevention, visit: [email or web address] OR  [email or web address] OR  https://www.cdc.gov/steadi/patient.html

## 2023-01-03 NOTE — DISCHARGE NOTE OB - HOSPITAL COURSE
Patient had uncomplicated, nonsurgical vaginal delivery.  Please see delivery note for details.  During postpartum course patient's vitals were stable, vaginal bleeding appropriate, and pain well controlled.  Patient was seen by Cardio-OB and Infectious Disease specialists while inpatient.  On day of discharge patient was ambulating, her pain controlled with oral medications, had adequate oral intake, and was voiding freely.  Discharge instructions and precautions were given.  Will return to [facility] in 2 days for BP check and in 6 weeks for postpartum visit.  Patient declining postpartum contraception.  Patient had uncomplicated, nonsurgical vaginal delivery.  Please see delivery note for details.  During postpartum course patient's vitals were stable, vaginal bleeding appropriate, and pain well controlled.  Patient was seen by Cardio-OB and Infectious Disease specialists while inpatient.  On day of discharge patient was ambulating, her pain controlled with oral medications, had adequate oral intake, and was voiding freely.  Discharge instructions and precautions were given.  Will follow up in [facility] in 2 days for BP check and in 6 weeks for postpartum visit.  Patient declining postpartum contraception.  Patient had uncomplicated, nonsurgical vaginal delivery.  Please see delivery note for details.  During postpartum course patient's vitals were stable, vaginal bleeding appropriate, and pain well controlled.  Patient was seen by Cardio-OB and Infectious Disease specialists while inpatient.  Patient with extensive psychiatric history.  Patient was seen by Psychiatry team while inpatient and was found to have no acute psychiatric contraindications to discharge.  Psychiatry gave referral to [facility] ([phone]) and  clinic ([phone]).  On day of discharge patient was ambulating, her pain controlled with oral medications, had adequate oral intake, and was voiding freely.  Discharge instructions and precautions were given.  Will follow up in [facility] in 2 days for BP check and in 6 weeks for postpartum visit.  Patient declining postpartum contraception.

## 2023-01-03 NOTE — CONSULT NOTE ADULT - ATTENDING COMMENTS
33 yo woman s/p  at 39 weeks,  HIV +   received zidovudine intra- partum  on Descovy and Tivicay following at [facility]  Suggestions above- c/w Descovy and Tivicay    Bactrim ppx SS 1 tab po daily  Plans to f/u at [facility]

## 2023-01-03 NOTE — DISCHARGE NOTE OB - MEDICATION SUMMARY - MEDICATIONS TO TAKE
I will START or STAY ON the medications listed below when I get home from the hospital:    acetaminophen 325 mg oral tablet  -- 3 tab(s) by mouth every 6 hours, As Needed  -- Indication: For Pain    ibuprofen 600 mg oral tablet  -- 1 tab(s) by mouth every 6 hours, As Needed  -- Indication: For Pain    dolutegravir 50 mg oral tablet  -- 1 tab(s) by mouth once a day  -- Indication: For HIV-1 infection    emtricitabine-tenofovir alafenamide 200 mg-25 mg oral tablet  -- 1 tab(s) by mouth once a day  -- Indication: For HIV-1 infection    ferrous sulfate 325 mg (65 mg elemental iron) oral tablet  -- 1 tab(s) by mouth 2 times a day  -- Indication: For Anemia    Prenatal Multivitamins with Folic Acid 1 mg oral tablet  -- 1 tab(s) by mouth once a day  -- Indication: For Postpartum care    sulfamethoxazole-trimethoprim 400 mg-80 mg oral tablet  -- 1 tab(s) by mouth once a day  -- Indication: For HIV-1 infection, infection prophylaxis    ascorbic acid 500 mg oral tablet  -- 1 tab(s) by mouth once a day  -- Indication: For Anemia   I will START or STAY ON the medications listed below when I get home from the hospital:    blood pressure cuff  -- 1 unit(s) transdermally 3 times a day   -- Indication: For BP monitoring    acetaminophen 325 mg oral tablet  -- 3 tab(s) by mouth every 6 hours, As Needed  -- Indication: For Pain    ibuprofen 600 mg oral tablet  -- 1 tab(s) by mouth every 6 hours, As Needed  -- Indication: For Pain    dolutegravir 50 mg oral tablet  -- 1 tab(s) by mouth once a day  -- Indication: For HIV-1 infection    emtricitabine-tenofovir alafenamide 200 mg-25 mg oral tablet  -- 1 tab(s) by mouth once a day  -- Indication: For HIV-1 infection    dibucaine 1% topical ointment  -- 1 application on skin every 6 hours, As needed, Perineal discomfort  -- Indication: For Perineal care    hydrocortisone 1% topical cream  -- 1 application on skin every 6 hours, As needed, Moderate Pain (4-6)  -- Indication: For Perineal care    ferrous sulfate 325 mg (65 mg elemental iron) oral tablet  -- 1 tab(s) by mouth 2 times a day  -- Indication: For Anemia    Prenatal Multivitamins with Folic Acid 1 mg oral tablet  -- 1 tab(s) by mouth once a day  -- Indication: For Postpartum care    sulfamethoxazole-trimethoprim 400 mg-80 mg oral tablet  -- 1 tab(s) by mouth once a day  -- Indication: For HIV-1 infection, infection prophylaxis    ascorbic acid 500 mg oral tablet  -- 1 tab(s) by mouth once a day  -- Indication: For Anemia

## 2023-01-03 NOTE — DISCHARGE NOTE OB - ADDITIONAL INSTRUCTIONS
After discharge, please stay on pelvic rest for 6 weeks, meaning no sexual intercourse, no tampons and no douching.  No driving for 2 weeks as women can loose a lot of blood during delivery and there is a possibility of being lightheaded/fainting.  No lifting objects heavier than baby for two weeks.  Expect to have vaginal bleeding/spotting for up to six weeks.  The bleeding should get lighter and more white/light brown with time.  For bleeding soaking more than a pad an hour or passing clots greater than the size of your fist, come in to the emergency department.    Follow up in 2 days for a BP check and again in 6 weeks for your postpartum visit.

## 2023-01-03 NOTE — DISCHARGE NOTE OB - PATIENT PORTAL LINK FT
You can access the FollowMyHealth Patient Portal offered by [facility] by registering at the following website: [email or web address]. By joining [employer]’s FollowMyHealth portal, you will also be able to view your health information using other applications (apps) compatible with our system.

## 2023-01-03 NOTE — DISCHARGE NOTE OB - CARE PROVIDER_API CALL
Hui Ackerman)  Cardiovascular Disease; Internal Medicine  [phone] [place] [address]  Phone: [phone]  Fax: [phone]  Follow Up Time:    Hui Ackerman)  Cardiovascular Disease; Internal Medicine  [address] [place] [address]  Phone: [phone]  Fax: [phone]  Follow Up Time:     [facility],   [address] [place] [address]  Phone: [phone]  Fax: (   )    -  Follow Up Time: 1-3 days   Hui Ackerman)  Cardiovascular Disease; Internal Medicine  [address] [place] [address]  Phone: [phone]  Fax: [phone]  Follow Up Time:     [facility],   [facility]  Ambulatory Care Unit  Oncology Building, Level C  [phone] [place] [address]  [phone]  Phone: (   )    -  Fax: (   )    -  Follow Up Time: 1-3 days

## 2023-01-03 NOTE — DISCHARGE NOTE OB - CARE PLAN
1 Principal Discharge DX:	Vaginal delivery  Secondary Diagnosis:	HIV-1 infection  Secondary Diagnosis:	Chronic hypertension   Principal Discharge DX:	Vaginal delivery  Assessment and plan of treatment:	After discharge, please stay on pelvic rest for 6 weeks, meaning no sexual intercourse, no tampons and no douching.  No driving for 2 weeks as women can loose a lot of blood during delivery and there is a possibility of being lightheaded/fainting.  No lifting objects heavier than baby for two weeks.  Expect to have vaginal bleeding/spotting for up to six weeks.  The bleeding should get lighter and more white/light brown with time.  For bleeding soaking more than a pad an hour or passing clots greater than the size of your fist, come in to the emergency department.    Follow up with [facility] in 6 weeks.  Secondary Diagnosis:	HIV-1 infection  Assessment and plan of treatment:	Please continue taking your antiretroviral medications as prescribed.  Continue taking the Bactrim daily.  Continue bottle feeding.  Secondary Diagnosis:	Chronic hypertension  Assessment and plan of treatment:	Please obtain a blood pressure cuff. A prescription was sent to your pharmacy. Take your blood pressure two times a day at the same time. Call your doctor if your blood pressure is greater than 140 systolic (top number) and/or 90 diastolic (bottom number). Please go to the hospital if the systolic (top number) is 160 or greater and/or if the diastolic (bottom number) is 110 or greater. Follow-up with your Ob/Gyn in 2-3 days for a blood pressure check    Call your doctor and/or go the hospital if you experience a headache not relieved by medication, severe abdominal pain, new onset changes in vision, worsening of lower extremity swelling or edema.   Principal Discharge DX:	Vaginal delivery  Assessment and plan of treatment:	After discharge, please stay on pelvic rest for 6 weeks, meaning no sexual intercourse, no tampons and no douching.  No driving for 2 weeks as women can loose a lot of blood during delivery and there is a possibility of being lightheaded/fainting.  No lifting objects heavier than baby for two weeks.  Expect to have vaginal bleeding/spotting for up to six weeks.  The bleeding should get lighter and more white/light brown with time.  For bleeding soaking more than a pad an hour or passing clots greater than the size of your fist, come in to the emergency department.    Follow up in clinic in 6 weeks.  Secondary Diagnosis:	HIV-1 infection  Assessment and plan of treatment:	Please continue taking your antiretroviral medications as prescribed.  Continue taking the Bactrim daily.  Continue bottle feeding.  Secondary Diagnosis:	Chronic hypertension  Assessment and plan of treatment:	Please obtain a blood pressure cuff. A prescription was sent to your pharmacy. Take your blood pressure two times a day at the same time. Call your doctor if your blood pressure is greater than 140 systolic (top number) and/or 90 diastolic (bottom number). Please go to the hospital if the systolic (top number) is 160 or greater and/or if the diastolic (bottom number) is 110 or greater. Follow-up with your Ob/Gyn in 2-3 days for a blood pressure check    Call your doctor and/or go the hospital if you experience a headache not relieved by medication, severe abdominal pain, new onset changes in vision, worsening of lower extremity swelling or edema.   Principal Discharge DX:	Vaginal delivery  Assessment and plan of treatment:	After discharge, please stay on pelvic rest for 6 weeks, meaning no sexual intercourse, no tampons and no douching.  No driving for 2 weeks as women can loose a lot of blood during delivery and there is a possibility of being lightheaded/fainting.  No lifting objects heavier than baby for two weeks.  Expect to have vaginal bleeding/spotting for up to six weeks.  The bleeding should get lighter and more white/light brown with time.  For bleeding soaking more than a pad an hour or passing clots greater than the size of your fist, come in to the emergency department.    Follow up in clinic in 6 weeks.  Secondary Diagnosis:	HIV-1 infection  Assessment and plan of treatment:	Please continue taking your antiretroviral medications as prescribed.  Continue taking the Bactrim daily.  Continue bottle feeding.  Secondary Diagnosis:	Chronic hypertension  Assessment and plan of treatment:	Please obtain a blood pressure cuff. A prescription was sent to your pharmacy. Take your blood pressure two times a day at the same time. Call your doctor if your blood pressure is greater than 140 systolic (top number) and/or 90 diastolic (bottom number). Please go to the hospital if the systolic (top number) is 160 or greater and/or if the diastolic (bottom number) is 110 or greater. Follow-up with your Ob/Gyn in 2-3 days for a blood pressure check    Call your doctor and/or go the hospital if you experience a headache not relieved by medication, severe abdominal pain, new onset changes in vision, worsening of lower extremity swelling or edema.  Secondary Diagnosis:	Mood disorder  Assessment and plan of treatment:	Please present to the emergency room immediately with any thoughts of harming yourself or others.  Please call [facility] ([phone]) or  clinic ([phone]) to schedule an appointment outpatient or if you are in crisis and in need of help.

## 2023-01-04 VITALS
RESPIRATION RATE: 16 BRPM | DIASTOLIC BLOOD PRESSURE: 77 MMHG | HEART RATE: 73 BPM | OXYGEN SATURATION: 97 % | TEMPERATURE: 98 F | SYSTOLIC BLOOD PRESSURE: 120 MMHG

## 2023-01-04 DIAGNOSIS — F39 UNSPECIFIED MOOD [AFFECTIVE] DISORDER: ICD-10-CM

## 2023-01-04 PROCEDURE — 99223 1ST HOSP IP/OBS HIGH 75: CPT

## 2023-01-04 RX ORDER — DIBUCAINE 1 %
1 OINTMENT (GRAM) RECTAL
Qty: 2 | Refills: 0
Start: 2023-01-04

## 2023-01-04 RX ORDER — ACETAMINOPHEN 500 MG
3 TABLET ORAL
Qty: 0 | Refills: 0 | DISCHARGE
Start: 2023-01-04

## 2023-01-04 RX ORDER — EMTRICITABINE AND TENOFOVIR DISOPROXIL FUMARATE 200; 300 MG/1; MG/1
1 TABLET, FILM COATED ORAL
Qty: 0 | Refills: 0 | DISCHARGE
Start: 2023-01-04

## 2023-01-04 RX ORDER — EMTRICITABINE AND TENOFOVIR DISOPROXIL FUMARATE 200; 300 MG/1; MG/1
0 TABLET, FILM COATED ORAL
Qty: 0 | Refills: 0 | DISCHARGE

## 2023-01-04 RX ORDER — FERROUS SULFATE 325(65) MG
1 TABLET ORAL
Qty: 0 | Refills: 0 | DISCHARGE
Start: 2023-01-04

## 2023-01-04 RX ORDER — SENNA PLUS 8.6 MG/1
1 TABLET ORAL
Refills: 0 | Status: DISCONTINUED | OUTPATIENT
Start: 2023-01-04 | End: 2023-01-04

## 2023-01-04 RX ORDER — HYDROCORTISONE 1 %
1 OINTMENT (GRAM) TOPICAL
Qty: 2 | Refills: 0
Start: 2023-01-04

## 2023-01-04 RX ORDER — FERROUS SULFATE 325(65) MG
325 TABLET ORAL
Refills: 0 | Status: DISCONTINUED | OUTPATIENT
Start: 2023-01-04 | End: 2023-01-04

## 2023-01-04 RX ORDER — IBUPROFEN 200 MG
1 TABLET ORAL
Qty: 0 | Refills: 0 | DISCHARGE
Start: 2023-01-04

## 2023-01-04 RX ORDER — DOLUTEGRAVIR SODIUM 25 MG/1
1 TABLET, FILM COATED ORAL
Qty: 0 | Refills: 0 | DISCHARGE
Start: 2023-01-04

## 2023-01-04 RX ORDER — DOLUTEGRAVIR SODIUM 25 MG/1
0 TABLET, FILM COATED ORAL
Qty: 0 | Refills: 0 | DISCHARGE

## 2023-01-04 RX ORDER — ASCORBIC ACID 60 MG
1 TABLET,CHEWABLE ORAL
Qty: 0 | Refills: 0 | DISCHARGE
Start: 2023-01-04

## 2023-01-04 RX ORDER — ASCORBIC ACID 60 MG
500 TABLET,CHEWABLE ORAL DAILY
Refills: 0 | Status: DISCONTINUED | OUTPATIENT
Start: 2023-01-04 | End: 2023-01-04

## 2023-01-04 RX ADMIN — Medication 500 MILLIGRAM(S): at 12:15

## 2023-01-04 RX ADMIN — Medication 975 MILLIGRAM(S): at 10:21

## 2023-01-04 RX ADMIN — Medication 600 MILLIGRAM(S): at 15:45

## 2023-01-04 RX ADMIN — Medication 975 MILLIGRAM(S): at 04:08

## 2023-01-04 RX ADMIN — Medication 1 TABLET(S): at 12:16

## 2023-01-04 RX ADMIN — Medication 325 MILLIGRAM(S): at 05:38

## 2023-01-04 RX ADMIN — Medication 600 MILLIGRAM(S): at 07:47

## 2023-01-04 RX ADMIN — SENNA PLUS 1 TABLET(S): 8.6 TABLET ORAL at 05:38

## 2023-01-04 RX ADMIN — Medication 975 MILLIGRAM(S): at 16:19

## 2023-01-04 RX ADMIN — Medication 600 MILLIGRAM(S): at 00:11

## 2023-01-04 RX ADMIN — Medication 1 TABLET(S): at 12:15

## 2023-01-04 RX ADMIN — Medication 600 MILLIGRAM(S): at 14:45

## 2023-01-04 RX ADMIN — Medication 975 MILLIGRAM(S): at 03:38

## 2023-01-04 RX ADMIN — Medication 975 MILLIGRAM(S): at 11:21

## 2023-01-04 RX ADMIN — Medication 975 MILLIGRAM(S): at 17:19

## 2023-01-04 RX ADMIN — Medication 600 MILLIGRAM(S): at 08:47

## 2023-01-04 NOTE — PROGRESS NOTE ADULT - ATTENDING COMMENTS
Associate Chief of L & D (Late entry)     I have met this patient for the first time today.  She was admitted by Dr Rader and delivered by Dr Mora. Patient has an extensive social history schizophrenia, bipolar, SI and polysubstance abuse in the past.  Patient also with CHTN and recently treatment of (+)HIV with CD4 count 188. Patient had limited care but did get care at [facility] Progress Note:  PPD#1    S: 31yo  PPD#1 s/p . Patient report that she feels that she is feeling attacked     O:  Vitals:  Vital Signs Last 24 Hrs  T(C): 36.7 (2023 14:00), Max: 37 (2023 18:41)  T(F): 98 (2023 14:00), Max: 98.6 (2023 18:41)  HR: 88 (2023 14:00) (82 - 105)  BP: 103/58 (2023 14:00) (103/58 - 124/64)  RR: 18 (2023 14:00) (18 - 18)  SpO2: 100% (2023 14:00) (96% - 100%)    Parameters below as of 2023 14:00  Patient On (Oxygen Delivery Method): room air        MEDICATIONS  (STANDING):  acetaminophen     Tablet .. 975 milliGRAM(s) Oral <User Schedule>  diphtheria/tetanus/pertussis (acellular) Vaccine (Adacel) 0.5 milliLiter(s) IntraMuscular once  dolutegravir 50 milliGRAM(s) Oral daily  emtricitabine 200 mG/tenofovir alafenamide 25 mG (DESCOVY) Tablet 1 Tablet(s) Oral daily  ibuprofen  Tablet. 600 milliGRAM(s) Oral every 6 hours  ondansetron Injectable 4 milliGRAM(s) IV Push once  oxytocin Infusion 41.667 milliUNIT(s)/Min (125 mL/Hr) IV Continuous <Continuous>  prenatal multivitamin 1 Tablet(s) Oral daily  sodium chloride 0.9% lock flush 3 milliLiter(s) IV Push every 8 hours  trimethoprim  160 mG/sulfamethoxazole 800 mG 1 Tablet(s) Oral two times a day  zidovudine Infusion 1 mG/kG/Hr (34.7 mL/Hr) IV Continuous <Continuous>      Labs:  Blood type: O Positive  Rubella IgG: RPR: Negative                          9.6<L>   15.17<H> >-----------< 186    (  @ 06:12 )             29.4<L>                        11.9   11.75<H> >-----------< 223    (  @ 13:00 )             36.4    23 @ 13:00      135  |  102  |  4<L>  ----------------------------<  83  3.8   |  18<L>  |  0.54        Ca    9.8      2023 13:00    TPro  7.8  /  Alb  4.0  /  TBili  0.3  /  DBili  x   /  AST  17  /  ALT  11  /  AlkPhos  457<H>  23 @ 13:00          Physical Exam:  General: NAD  Abdomen: soft, non-tender, non-distended, fundus firm  Vaginal: Lochia scant   Extremities: No erythema/trace edema    A/P: 31yo PPD#1 s/p  with the diagnosis of HIV(+)/AIDS, cHTN, and multiple psychiatric conditions.  - Pain well controlled, continue current pain regimen  - Increase ambulation, SCDs when not ambulating  - Continue regular diet  - Tight bra and ice to the breast for 48 hours  - Spoke with the patient and made her aware of why we asked psychiatry to come and see her and she reported that she felt that she was being " attacked" and she wanted to see HR.  Called Patient experience and made them aware of the request and took the representative Marleny  to see the patient  - Psychiatry did come to see the patient but she refused. to be seen  - SW did see the patient and    - Continue home anti virals   - start Bactrim prophylaxis  - answered all her concerns and questions  I was made aware from Marleny that they would close the loop-   I have read the psychiatry note placed in the chart   - Attempted to get more information regarding the reported heart attack at age 20 - but she stated she does not remember much and that it was in [place].  - Monitor Bp closely     Rebecca Sanchez M.D., M.B.A., M.S.
Associate Chief of L & D (Late entry)      OB Progress Note:  PPD#2    S: 31yo  PPD2  s/p . Patient  denies any complaints this morning    O:  Vital Signs Last 24 Hrs  T(C): 37.1 (2023 06:44), Max: 37.1 (2023 06:44)  T(F): 98.7 (2023 06:44), Max: 98.7 (2023 06:44)  HR: 82 (2023 06:44) (80 - 88)  BP: 96/52 (2023 06:44) (96/52 - 123/75)  RR: 18 (2023 06:44) (16 - 18)  SpO2: 99% (2023 06:44) (99% - 100%)      MEDICATIONS  (STANDING):  acetaminophen     Tablet .. 975 milliGRAM(s) Oral <User Schedule>  diphtheria/tetanus/pertussis (acellular) Vaccine (Adacel) 0.5 milliLiter(s) IntraMuscular once  dolutegravir 50 milliGRAM(s) Oral daily  emtricitabine 200 mG/tenofovir alafenamide 25 mG (DESCOVY) Tablet 1 Tablet(s) Oral daily  ibuprofen  Tablet. 600 milliGRAM(s) Oral every 6 hours  ondansetron Injectable 4 milliGRAM(s) IV Push once  oxytocin Infusion 41.667 milliUNIT(s)/Min (125 mL/Hr) IV Continuous <Continuous>  prenatal multivitamin 1 Tablet(s) Oral daily  sodium chloride 0.9% lock flush 3 milliLiter(s) IV Push every 8 hours  trimethoprim  160 mG/sulfamethoxazole 800 mG 1 Tablet(s) Oral two times a day  zidovudine Infusion 1 mG/kG/Hr (34.7 mL/Hr) IV Continuous <Continuous>      Labs:  Blood type: O Positive  Rubella IgG: RPR: Negative                          9.6<L>   15.17<H> >-----------< 186    (  @ 06:12 )             29.4<L>                        11.9   11.75<H> >-----------< 223    (  @ 13:00 )             36.4    23 @ 13:00      135  |  102  |  4<L>  ----------------------------<  83  3.8   |  18<L>  |  0.54              Physical Exam:    Abdomen: soft, non-tender, non-distended, fundus firm  Vaginal: Lochia scant   Extremities: No erythema/trace edema    A/P: 31yo PPD#2 s/p  with the diagnosis of HIV(+)/AIDS, cHTN, and multiple psychiatric conditions.  Patient appears guarded with some of her responses to nurses doctors and SW.  She did report that she has not had any further psychiatric inpateint for the last 3 years.  She did inform the SW that she could speak with the SW from [facility].  At this time, she is not exhibiting any psychiatric behavior/concerns.     - Patient is stable at this time for discharge   - SW to call patient SW from [facility] to discuss  - Psychiatry did come to see the patient yesterday but she refused. to be seen  - SW did see the patient and recommends psychiatry evaluation- patient refusing psychiatry consult  - Continue home anti virals   - continue  Bactrim prophylaxis  -  Patient was seen by the cardio Ob team and they will follow her out patient   - Monitor BP closely     Rebecca Sanchez M.D., M.B.A., M.S.

## 2023-01-04 NOTE — BH CONSULTATION LIAISON ASSESSMENT NOTE - OTHER PAST PSYCHIATRIC HISTORY (INCLUDE DETAILS REGARDING ONSET, COURSE OF ILLNESS, INPATIENT/OUTPATIENT TREATMENT)
PTSD from traumas when she was 13, depression, suicide attempts during teenage years, and two inpatient psychiatric admissions a few years ago but nothing since.

## 2023-01-04 NOTE — BH CONSULTATION LIAISON ASSESSMENT NOTE - SUMMARY
Pt is a 31yo F, domiciled alone in private residence, unemployed, PPH of bipolar disorder, anxiety, and PTSD, two previous inpatient psychiatric admissions, multiple previous suicide attempts during her teenage years with no recent suicide attempts, not followed by psychiatry and on no psychiatric medications, hx of cocaine and marijuana use (has not used since before pregnancy), PMH of HIV and AIDS, previous MI, presenting to OB for delivery, psychiatry consulted for evaluation of psychiatric needs given psychiatric history. Pt currently with no acute psychiatric concerns, denying mood or psychosis symptoms, noted to be bonding well with the baby, not using any substances, and having stable mood. Pt not wanting any psychotropic medications but willing to have resources for follow up as an outpatient.    Plan  -provide pt with information for [facility] ([phone]) and  clinic ([phone]) Pt is a 33yo F, domiciled alone in private residence, unemployed, PPH of self reported bipolar disorder, anxiety, and PTSD, two previous inpatient psychiatric admissions, multiple previous suicide attempts during her teenage years with no recent suicide attempts, not followed by psychiatry and on no psychiatric medications, hx of cocaine and marijuana use (has not used since before pregnancy), PMH of HIV and AIDS, previous MI, presenting to OB for delivery, psychiatry consulted for evaluation of psychiatric needs given psychiatric history. Pt currently with no acute psychiatric concerns, denying mood or psychosis symptoms, noted to be bonding well with the baby, not using any substances, and having stable mood. Pt not wanting any psychotropic medications but willing to have resources for follow up as an outpatient.    Plan  -provide pt with information for [facility] ([phone]) and  clinic ([phone])  -Attempted to collect collateral information from father, Mr. Berger, at [phone] but he did not answer.

## 2023-01-04 NOTE — BH CONSULTATION LIAISON ASSESSMENT NOTE - NSBHCHARTREVIEWVS_PSY_A_CORE FT
Vital Signs Last 24 Hrs  T(C): 37.1 (04 Jan 2023 06:44), Max: 37.1 (04 Jan 2023 06:44)  T(F): 98.7 (04 Jan 2023 06:44), Max: 98.7 (04 Jan 2023 06:44)  HR: 82 (04 Jan 2023 06:44) (80 - 88)  BP: 96/52 (04 Jan 2023 06:44) (96/52 - 123/75)  BP(mean): --  RR: 18 (04 Jan 2023 06:44) (16 - 18)  SpO2: 99% (04 Jan 2023 06:44) (99% - 99%)    Parameters below as of 04 Jan 2023 06:44  Patient On (Oxygen Delivery Method): room air

## 2023-01-04 NOTE — BH CONSULTATION LIAISON ASSESSMENT NOTE - CURRENT MEDICATION
MEDICATIONS  (STANDING):  acetaminophen     Tablet .. 975 milliGRAM(s) Oral <User Schedule>  ascorbic acid 500 milliGRAM(s) Oral daily  diphtheria/tetanus/pertussis (acellular) Vaccine (Adacel) 0.5 milliLiter(s) IntraMuscular once  dolutegravir 50 milliGRAM(s) Oral daily  emtricitabine 200 mG/tenofovir alafenamide 25 mG (DESCOVY) Tablet 1 Tablet(s) Oral daily  ferrous    sulfate 325 milliGRAM(s) Oral two times a day  ibuprofen  Tablet. 600 milliGRAM(s) Oral every 6 hours  ondansetron Injectable 4 milliGRAM(s) IV Push once  prenatal multivitamin 1 Tablet(s) Oral daily  senna 1 Tablet(s) Oral two times a day  sodium chloride 0.9% lock flush 3 milliLiter(s) IV Push every 8 hours  trimethoprim   80 mG/sulfamethoxazole 400 mG 1 Tablet(s) Oral daily    MEDICATIONS  (PRN):  benzocaine 20%/menthol 0.5% Spray 1 Spray(s) Topical every 6 hours PRN for Perineal discomfort  dibucaine 1% Ointment 1 Application(s) Topical every 6 hours PRN Perineal discomfort  diphenhydrAMINE 25 milliGRAM(s) Oral every 6 hours PRN Pruritus  hydrocortisone 1% Cream 1 Application(s) Topical every 6 hours PRN Moderate Pain (4-6)  lanolin Ointment 1 Application(s) Topical every 6 hours PRN nipple soreness  magnesium hydroxide Suspension 30 milliLiter(s) Oral two times a day PRN Constipation  oxyCODONE    IR 5 milliGRAM(s) Oral every 3 hours PRN Moderate to Severe Pain (4-10)  oxyCODONE    IR 5 milliGRAM(s) Oral once PRN Moderate to Severe Pain (4-10)  pramoxine 1%/zinc 5% Cream 1 Application(s) Topical every 4 hours PRN Moderate Pain (4-6)  simethicone 80 milliGRAM(s) Chew every 4 hours PRN Gas  witch hazel Pads 1 Application(s) Topical every 4 hours PRN Perineal discomfort

## 2023-01-04 NOTE — BH CONSULTATION LIAISON ASSESSMENT NOTE - RISK ASSESSMENT
Risk factors: h/o SA/SIB, h/o psych admissions, not receiving treatment    Protective factors: no current SIIP/HIIP, no access to weapons, no active substance abuse, no psychosis, dependent children, domiciled, social supports, help-seeking behaviors    Overall, pt is a low risk of harm to self/others and does not meet criteria for psychiatric admission. Risk factors: h/o SA/SIB, h/o psych admissions, not receiving treatment    Protective factors: no current SIIP/HIIP, no access to weapons, no active substance abuse, no psychosis, dependent children, domiciled, social supports, help-seeking behaviors, hopeful, future oriented, treatment seeking    Overall, pt is a low risk of harm to self/others and does not meet criteria for psychiatric admission.

## 2023-01-04 NOTE — CHART NOTE - NSCHARTNOTEFT_GEN_A_CORE
Patient now desires to follow up with [facility] for postpartum care.  Discharge document updated to reflect patient's choice of postpartum care location.    d/w Dr. Kyle Lorenzo PGY1

## 2023-01-04 NOTE — CHART NOTE - NSCHARTNOTEFT_GEN_A_CORE
Associate Chief of L & D    Called by SW from the patient room that she went to speak with the patient about making a referral to ACS due to concerns that she has had no follow up from psychiatry since her admission 3 years ago and concerns for the .  This escalated and she requested to see HR and Patient experience.  I have notified all person and also went back and spoke to the patient that this was done and also explained the concerns from the provider team.  She stated that she is amenable to speaking with psyciatry.  They were called and will see the patient as soon as they can.    Rebecca Sanchez M.D., M.B.A., M.S.

## 2023-01-04 NOTE — BH CONSULTATION LIAISON ASSESSMENT NOTE - NSBHATTESTCOMMENTATTENDFT_PSY_A_CORE
Chart reviewed, pt. seen/evaluated with trainee, I agree with above assessment/plan. Patient oriented, well engaged, euthymic, polite/cooperative with linear/ coherent thought process. Patient denies sxs of depression, denies hopelessness, no evidence of lisa or psychosis, pt. firmly denies passive or active SIIP, denies HIIP, denies thought of harming the baby. Patient does not want to be on psychiatric medications at this time as she thinks her mood has been stable. She is amenable to follow up with outpatient psychiatry. Patient was seen bonding well with the baby during an evaluation. Plan as above, case d/w team in person.

## 2023-01-04 NOTE — PROGRESS NOTE ADULT - SUBJECTIVE AND OBJECTIVE BOX
INCOMPLETE NOTE    R1 Progress Note    Patient seen and examined at bedside, no acute overnight events. No acute complaints, pain well controlled. Patient is ambulating, voiding spontaneously, passing gas, and tolerating regular diet. Denies CP, SOB, N/V, HA, blurred vision, epigastric pain.    Vital Signs Last 24 Hours  T(C): 36.3 (01-04-23 @ 01:30), Max: 36.7 (01-03-23 @ 14:00)  HR: 88 (01-04-23 @ 01:30) (80 - 93)  BP: 123/75 (01-04-23 @ 01:30) (102/50 - 123/75)  RR: 18 (01-04-23 @ 01:30) (16 - 18)  SpO2: 99% (01-04-23 @ 01:30) (99% - 100%)    Physical Exam:  General: NAD  Abdomen: Soft, appropriately tender, non-distended, fundus firm  Pelvic: Lochia wnl    Labs:    Blood Type: O Positive  Antibody Screen: Negative  RPR: Negative               9.6    15.17 )-----------( 186      ( 01-03 @ 06:12 )             29.4                11.9   11.75 )-----------( 223      ( 01-01 @ 13:00 )             36.4         MEDICATIONS  (STANDING):  acetaminophen     Tablet .. 975 milliGRAM(s) Oral <User Schedule>  ascorbic acid 500 milliGRAM(s) Oral daily  diphtheria/tetanus/pertussis (acellular) Vaccine (Adacel) 0.5 milliLiter(s) IntraMuscular once  dolutegravir 50 milliGRAM(s) Oral daily  emtricitabine 200 mG/tenofovir alafenamide 25 mG (DESCOVY) Tablet 1 Tablet(s) Oral daily  ferrous    sulfate 325 milliGRAM(s) Oral two times a day  ibuprofen  Tablet. 600 milliGRAM(s) Oral every 6 hours  ondansetron Injectable 4 milliGRAM(s) IV Push once  prenatal multivitamin 1 Tablet(s) Oral daily  senna 1 Tablet(s) Oral two times a day  sodium chloride 0.9% lock flush 3 milliLiter(s) IV Push every 8 hours  trimethoprim   80 mG/sulfamethoxazole 400 mG 1 Tablet(s) Oral daily    MEDICATIONS  (PRN):  benzocaine 20%/menthol 0.5% Spray 1 Spray(s) Topical every 6 hours PRN for Perineal discomfort  dibucaine 1% Ointment 1 Application(s) Topical every 6 hours PRN Perineal discomfort  diphenhydrAMINE 25 milliGRAM(s) Oral every 6 hours PRN Pruritus  hydrocortisone 1% Cream 1 Application(s) Topical every 6 hours PRN Moderate Pain (4-6)  lanolin Ointment 1 Application(s) Topical every 6 hours PRN nipple soreness  magnesium hydroxide Suspension 30 milliLiter(s) Oral two times a day PRN Constipation  oxyCODONE    IR 5 milliGRAM(s) Oral every 3 hours PRN Moderate to Severe Pain (4-10)  oxyCODONE    IR 5 milliGRAM(s) Oral once PRN Moderate to Severe Pain (4-10)  pramoxine 1%/zinc 5% Cream 1 Application(s) Topical every 4 hours PRN Moderate Pain (4-6)  simethicone 80 milliGRAM(s) Chew every 4 hours PRN Gas  witch hazel Pads 1 Application(s) Topical every 4 hours PRN Perineal discomfort   R1 Progress Note    Patient seen and examined at bedside, no acute overnight events. No acute complaints, pain well controlled. Patient is ambulating, voiding spontaneously, passing gas, and tolerating regular diet. Denies CP, SOB, N/V, HA, blurred vision, epigastric pain.    Vital Signs Last 24 Hours  T(C): 36.3 (01-04-23 @ 01:30), Max: 36.7 (01-03-23 @ 14:00)  HR: 88 (01-04-23 @ 01:30) (80 - 93)  BP: 123/75 (01-04-23 @ 01:30) (102/50 - 123/75)  RR: 18 (01-04-23 @ 01:30) (16 - 18)  SpO2: 99% (01-04-23 @ 01:30) (99% - 100%)    Physical Exam:  General: NAD  Abdomen: Soft, appropriately tender, non-distended, fundus firm  Pelvic: Lochia wnl    Labs:    Blood Type: O Positive  Antibody Screen: Negative  RPR: Negative               9.6    15.17 )-----------( 186      ( 01-03 @ 06:12 )             29.4                11.9   11.75 )-----------( 223      ( 01-01 @ 13:00 )             36.4         MEDICATIONS  (STANDING):  acetaminophen     Tablet .. 975 milliGRAM(s) Oral <User Schedule>  ascorbic acid 500 milliGRAM(s) Oral daily  diphtheria/tetanus/pertussis (acellular) Vaccine (Adacel) 0.5 milliLiter(s) IntraMuscular once  dolutegravir 50 milliGRAM(s) Oral daily  emtricitabine 200 mG/tenofovir alafenamide 25 mG (DESCOVY) Tablet 1 Tablet(s) Oral daily  ferrous    sulfate 325 milliGRAM(s) Oral two times a day  ibuprofen  Tablet. 600 milliGRAM(s) Oral every 6 hours  ondansetron Injectable 4 milliGRAM(s) IV Push once  prenatal multivitamin 1 Tablet(s) Oral daily  senna 1 Tablet(s) Oral two times a day  sodium chloride 0.9% lock flush 3 milliLiter(s) IV Push every 8 hours  trimethoprim   80 mG/sulfamethoxazole 400 mG 1 Tablet(s) Oral daily    MEDICATIONS  (PRN):  benzocaine 20%/menthol 0.5% Spray 1 Spray(s) Topical every 6 hours PRN for Perineal discomfort  dibucaine 1% Ointment 1 Application(s) Topical every 6 hours PRN Perineal discomfort  diphenhydrAMINE 25 milliGRAM(s) Oral every 6 hours PRN Pruritus  hydrocortisone 1% Cream 1 Application(s) Topical every 6 hours PRN Moderate Pain (4-6)  lanolin Ointment 1 Application(s) Topical every 6 hours PRN nipple soreness  magnesium hydroxide Suspension 30 milliLiter(s) Oral two times a day PRN Constipation  oxyCODONE    IR 5 milliGRAM(s) Oral every 3 hours PRN Moderate to Severe Pain (4-10)  oxyCODONE    IR 5 milliGRAM(s) Oral once PRN Moderate to Severe Pain (4-10)  pramoxine 1%/zinc 5% Cream 1 Application(s) Topical every 4 hours PRN Moderate Pain (4-6)  simethicone 80 milliGRAM(s) Chew every 4 hours PRN Gas  witch hazel Pads 1 Application(s) Topical every 4 hours PRN Perineal discomfort

## 2023-01-04 NOTE — CHART NOTE - NSCHARTNOTEFT_GEN_A_CORE
Spoke with Psychiatry team after their evaluation of patient.  Per Dr. Chi, patient was appropriate on exam and refuses medication at this time.  Per team, no acute psychiatric concerns at time of evaluation.  Patient gave Psychiatry team consent to speak with [facility] to obtain collateral.  Psych will attempt to reach out to [facility] to obtain collateral on patient.  Psychiatry team also stated that they will provide referral for outpatient Psychiatry after discharge.    d/w Dr. Kyle Lorenzo PGY1 Spoke with Psychiatry team after their evaluation of patient.  Per Dr. Chi, patient was appropriate on exam and refuses medication at this time.  Per team, no acute psychiatric concerns at time of evaluation.  Patient gave Psychiatry team consent to speak with patient's father to obtain collateral.  Psych will attempt to reach out to patient's father to obtain collateral on patient.  Psychiatry team also stated that they will provide referral for outpatient Psychiatry after discharge.    d/w Dr. Kyle Lorenzo PGY1

## 2023-01-04 NOTE — PROGRESS NOTE ADULT - ASSESSMENT
33y/o  PPD#2 from .  PMH significant for AIDS, cHTN, and multiple psychiatric conditions. Patient is stable and progressing appropriately postpartum.    #AIDS  - Most recent CD4 count in patient record 188 from , CD4 count on 1/3: 227  - Received Zidyuvidine during labor  - On home Tivicay and Descovy  - C/w Bactrim for infection ppx  - ID consulted, recommended home antiretrovirals and Bactrim  - Patient bottle feeding infant, advised tight bra, cool packs, and limiting nipple stimulation to assist with decreasing breast milk production    #cHTN  - awaiting TTE  - EKG NSR  - Cardio OB consulted, recommended outpatient follow up, no need for urgent inpatient TTE  - BP overnight: 100-120s/50-70s  - Not on any standing antihypertensives    #Psychiatric history  - Patient with extensive psychiatric history including anxiety, depression, schizophrenia, bipolar disorder, and history of suicide attempt with inpatient psychiatric hospitalization  - Psychiatry consulted, patient refusing to speak with psychiatric team    #Postpartum  - Continue with po analgesia  - Increase ambulation  - Continue regular diet  - Desires to follow up with [facility] for postpartum care  - Counseled on postpartum contraception, declining contraception at this time    **INCOMPLETE NOTE**  Mariluz Lorenzo, PGY1   33y/o  PPD#2 from .  PMH significant for AIDS, cHTN, and multiple psychiatric conditions. Patient is stable and progressing appropriately postpartum.    #AIDS  - Most recent CD4 count in patient record 188 from , CD4 count on 1/3: 227  - Received Zidovudine during labor  - On home Tivicay and Descovy  - C/w Bactrim for infection ppx  - ID consulted, recommended home antiretrovirals and Bactrim  - Patient bottle feeding infant, advised tight bra, cool packs, and limiting nipple stimulation to assist with decreasing breast milk production    #cHTN  - EKG NSR  - Cardio OB consulted, recommended outpatient follow up, no need for urgent inpatient TTE, TTE order cancelled  - BP overnight: 100-120s/50-70s  - Not on any standing antihypertensives  - Patient amenable to follow up outpatient with Cardio-OB    #Psychiatric history  - Patient with extensive psychiatric history including anxiety, depression, schizophrenia, bipolar disorder, and history of suicide attempt with inpatient psychiatric hospitalization  - Psychiatry consulted, patient refusing to speak with psychiatric team    #Postpartum  - Continue with po analgesia  - Increase ambulation  - Continue regular diet  - Desires to follow up with [facility] for postpartum care  - Counseled on postpartum contraception, declining contraception at this time  - Discharge instructions and precautions reviewed with patient    Mariluz Bj, PGY1   31y/o  PPD#2 from .  PMH significant for AIDS, cHTN, and multiple psychiatric conditions. Patient is stable and progressing appropriately postpartum.    #AIDS  - Most recent CD4 count in patient record 188 from , CD4 count on 1/3: 227  - Received Zidovudine during labor  - On home Tivicay and Descovy  - C/w Bactrim for infection ppx  - ID consulted, recommended home antiretrovirals and Bactrim  - Patient bottle feeding infant, advised tight bra, cool packs, and limiting nipple stimulation to assist with decreasing breast milk production    #cHTN  - EKG NSR  - Cardio OB consulted, recommended outpatient follow up, no need for urgent inpatient TTE, TTE order cancelled  - BP overnight: 100-120s/50-70s  - Not on any standing antihypertensives  - Patient amenable to follow up outpatient with Cardio-OB    #Psychiatric history  - Patient with extensive psychiatric history including anxiety, depression, schizophrenia, bipolar disorder, and history of suicide attempt with inpatient psychiatric hospitalization  - Psychiatry consulted, patient refusing to speak with psychiatric team    #Postpartum  - Continue with po analgesia  - Increase ambulation  - Continue regular diet  - Desires to follow up with [facility] for postpartum care  - Counseled on postpartum contraception, declining contraception at this time  - Discharge instructions and precautions reviewed with patient.  BP monitoring reviewed with patient.  Patient aware to call clinic with BP >140/90 and to come into hospital for BP >160/100    Mariluz Lorenzo, PGY1

## 2023-01-04 NOTE — BH CONSULTATION LIAISON ASSESSMENT NOTE - HPI (INCLUDE ILLNESS QUALITY, SEVERITY, DURATION, TIMING, CONTEXT, MODIFYING FACTORS, ASSOCIATED SIGNS AND SYMPTOMS)
Pt is a 33yo F, domiciled alone in private residence, unemployed, PPH of bipolar disorder, anxiety, and PTSD, two previous inpatient psychiatric admissions, multiple previous suicide attempts during her teenage years with no recent suicide attempts, not followed by psychiatry and on no psychiatric medications, hx of cocaine and marijuana use (has not used since before pregnancy), PMH of HIV and AIDS, previous MI, presenting to OB for delivery, psychiatry consulted for evaluation of psychiatric needs given psychiatric history.     Pt states that she has been doing very well over the last two years and even better since finding out that she is pregnant, stating that her pregnancy "saved her" and that having a baby has been a dream of hers for her entire life. She says that her mood has been stable or happy throughout her pregnancy and even happier now that she has had her baby. She denies any issues with sleep, appetite. Denies any suicidal thoughts during her pregnancy or now. Pt also denies any manic episodes. Pt states that she was doing unwell two years ago when she had a "breakdown" and needed inpatient hospitalization, unable to discuss exactly what happened but says that she was doing behaviors that were out of place for her. After back to back inpatient psychiatric hospitalizations, she was followed by [facility] outpatient psychiatry for a few months but decided to discontinue due to lack of satisfaction with the service, no longer taking any psychiatric medications (was previously on Xanax per pt but did not find it helpful). Pt denies any anxiety, AVH, or paranoia. Pt states that she previously used cocaine, alcohol, and marijuana but has not used any substances in a year and plans to continue not using since she is a mother now. Pt also states that she has good supports from her parents and baby's father although these supports live in different cities. Pt also has other friends nearby that she can call for support. At this time, pt does not want to start any psychotropic medication and says that she is doing well and is willing to follow up as an outpatient.     Attempted to collect collateral information from father, Mr. Berger, at [phone] but he did not answer. Pt is a 31yo F, domiciled alone in private residence, unemployed, PPH of self reported bipolar disorder, anxiety, and PTSD, two previous inpatient psychiatric admissions, multiple previous suicide attempts during her teenage years with no recent suicide attempts, not followed by psychiatry and on no psychiatric medications, hx of cocaine and marijuana use (has not used since before pregnancy), PMH of HIV and AIDS, previous MI, presenting to OB for delivery, psychiatry consulted for evaluation of psychiatric needs given psychiatric history.     Pt states that she has been doing very well over the last two years and even better since finding out that she is pregnant, stating that her pregnancy "saved her" and that having a baby has been a dream of hers for her entire life. She says that her mood has been stable or happy throughout her pregnancy and even happier now that she has had her baby. She denies any issues with sleep, appetite. Denies any suicidal thoughts during her pregnancy or now. Pt also denies any manic episodes. Pt states that she was doing unwell two years ago when she had a "breakdown" and needed inpatient hospitalization, unable to discuss exactly what happened but says that she was doing behaviors that were out of place for her. After back to back inpatient psychiatric hospitalizations, she was followed by [facility] outpatient psychiatry for a few months but decided to discontinue due to lack of satisfaction with the service, no longer taking any psychiatric medications (was previously on Xanax per pt but did not find it helpful). Pt denies any anxiety, AVH, or paranoia. Pt states that she previously used cocaine, alcohol, and marijuana but has not used any substances in a year and plans to continue not using since she is a mother now. Pt also states that she has good supports from her parents and baby's father although these supports live in different cities. Pt also has other friends nearby that she can call for support. At this time, pt does not want to start any psychotropic medication and says that she is doing well and is willing to follow up as an outpatient. She denies HI, denies thoughts of harming the baby. Educated extensively about postpartum mood sxs, she verbalized understanding.     Attempted to collect collateral information from father, Mr. Berger, at [phone] but he did not answer. (pt. gave consent)

## 2023-01-04 NOTE — BH CONSULTATION LIAISON ASSESSMENT NOTE - DESCRIPTION
Pt lives alone, has own apartment in [place], has support from parents and baby's father although they live in different cities, unemployed

## 2023-01-04 NOTE — BH CONSULTATION LIAISON ASSESSMENT NOTE - NSBHCONSULTPRIMARYDISCUSSYES_PSY_A_CORE FT
discussed with primary care team  discussed with primary care team in person discussed with primary care team in person  d/w Dr. Mariluz Lorenzo.

## 2023-01-05 ENCOUNTER — NON-APPOINTMENT (OUTPATIENT)
Age: 33
End: 2023-01-05

## 2023-01-05 ENCOUNTER — TRANSCRIPTION ENCOUNTER (OUTPATIENT)
Age: 33
End: 2023-01-05

## 2023-01-05 DIAGNOSIS — B20 HUMAN IMMUNODEFICIENCY VIRUS [HIV] DISEASE: ICD-10-CM

## 2023-01-05 PROBLEM — Z00.00 ENCOUNTER FOR PREVENTIVE HEALTH EXAMINATION: Status: ACTIVE | Noted: 2023-01-05

## 2023-01-05 RX ORDER — EMTRICITABINE AND TENOFOVIR ALAFENAMIDE 200; 25 MG/1; MG/1
200-25 TABLET ORAL DAILY
Refills: 0 | Status: ACTIVE | COMMUNITY
Start: 2023-01-05

## 2023-01-05 RX ORDER — DOLUTEGRAVIR SODIUM 50 MG/1
50 TABLET, FILM COATED ORAL DAILY
Refills: 0 | Status: ACTIVE | COMMUNITY
Start: 2023-01-05

## 2023-01-05 RX ORDER — PRENATAL VIT NO.126/IRON/FOLIC 28MG-0.8MG
28-0.8 TABLET ORAL DAILY
Refills: 0 | Status: ACTIVE | COMMUNITY
Start: 2023-01-05

## 2023-01-05 RX ORDER — MULTIVIT-MIN/FOLIC/VIT K/LYCOP 400-300MCG
500 TABLET ORAL DAILY
Refills: 0 | Status: ACTIVE | COMMUNITY
Start: 2023-01-05

## 2023-01-05 RX ORDER — FERROUS SULFATE TAB EC 325 MG (65 MG FE EQUIVALENT) 325 (65 FE) MG
325 (65 FE) TABLET DELAYED RESPONSE ORAL DAILY
Refills: 0 | Status: ACTIVE | COMMUNITY
Start: 2023-01-05

## 2023-01-06 PROBLEM — N83.201 UNSPECIFIED OVARIAN CYST, RIGHT SIDE: Chronic | Status: ACTIVE | Noted: 2023-01-01

## 2023-01-06 PROBLEM — I10 ESSENTIAL (PRIMARY) HYPERTENSION: Chronic | Status: ACTIVE | Noted: 2023-01-01

## 2023-01-07 ENCOUNTER — TRANSCRIPTION ENCOUNTER (OUTPATIENT)
Age: 33
End: 2023-01-07

## 2023-01-08 ENCOUNTER — NON-APPOINTMENT (OUTPATIENT)
Age: 33
End: 2023-01-08

## 2023-01-09 ENCOUNTER — APPOINTMENT (OUTPATIENT)
Dept: OBGYN | Facility: HOSPITAL | Age: 33
End: 2023-01-09

## 2023-01-09 ENCOUNTER — OUTPATIENT (OUTPATIENT)
Dept: OUTPATIENT SERVICES | Facility: HOSPITAL | Age: 33
LOS: 1 days | End: 2023-01-09

## 2023-01-09 VITALS
HEART RATE: 90 BPM | SYSTOLIC BLOOD PRESSURE: 110 MMHG | DIASTOLIC BLOOD PRESSURE: 74 MMHG | WEIGHT: 134.8 LBS | TEMPERATURE: 98.1 F | BODY MASS INDEX: 23.01 KG/M2 | HEIGHT: 64 IN

## 2023-01-09 DIAGNOSIS — Z90.49 ACQUIRED ABSENCE OF OTHER SPECIFIED PARTS OF DIGESTIVE TRACT: Chronic | ICD-10-CM

## 2023-01-09 DIAGNOSIS — Z96.22 MYRINGOTOMY TUBE(S) STATUS: Chronic | ICD-10-CM

## 2023-01-09 DIAGNOSIS — Z98.890 OTHER SPECIFIED POSTPROCEDURAL STATES: Chronic | ICD-10-CM

## 2023-01-09 DIAGNOSIS — Z21 ASYMPTOMATIC HUMAN IMMUNODEFICIENCY VIRUS [HIV] INFECTION STATUS: ICD-10-CM

## 2023-01-09 DIAGNOSIS — O13.9 GESTATIONAL [PREGNANCY-INDUCED] HYPERTENSION W/OUT SIGNIFICANT PROTEINURIA, UNSPECIFIED TRIMESTER: ICD-10-CM

## 2023-01-09 NOTE — HISTORY OF PRESENT ILLNESS
[Postpartum Follow Up] : postpartum follow up [Complications:___] : complications include: [unfilled] [Delivery Date: ___] : on [unfilled] [] : delivered by vaginal delivery [Female] : Delivery History: baby girl [Wt. ___] : weighing [unfilled] [Pertussis Vaccine] : Pertussis vaccine administered [Discharge HCT: ___] : hematocrit level was [unfilled] [Discharge HGB: ___] : hemoglobin level was [unfilled] [None] : No associated symptoms are reported [Doing Well] : is doing well [Breastfeeding] : not currently nursing [Resumed Menses] : has not resumed her menses [Resumed [place]] : has not resumed intercourse [Intended Contraception] : the patient does not intended to use contraception postpartum [S/Sx PP Depression] : no signs/symptoms of postpartum depression [FreeTextEntry8] : Here for postpartum BP check. Vaginal delivery 1/2/23. Hx Gestational HTN/HIV + [de-identified] : Rx Prenatal vits 1 tab po once daily. /74. Called [facility]- pt has BP cuff waiting to get picked up. Discussed importance of monitoring BP- aware to report elevations greater than 140/90. Follow with ID at [facility]- discussed importance of follow up appt. Contraceptive counseling- declines birth control- does not plan on being sexually active. States last time I had sex was 7/2022. Aware of contraceptives available. Bottlefeeding/happy with baby. Jessica SCRUGGS went to see patient- declined  services. Discussed risk postpartum depression- advised to call if any signs depression- verbalized understanding. On HIV medication- will follow up with [facility]. RTC 4 weeks. MHegarty NP

## 2023-01-12 ENCOUNTER — TRANSCRIPTION ENCOUNTER (OUTPATIENT)
Age: 33
End: 2023-01-12

## 2023-01-12 DIAGNOSIS — Z01.30 ENCOUNTER FOR EXAMINATION OF BLOOD PRESSURE WITHOUT ABNORMAL FINDINGS: ICD-10-CM

## 2023-01-12 DIAGNOSIS — O13.9 GESTATIONAL [PREGNANCY-INDUCED] HYPERTENSION WITHOUT SIGNIFICANT PROTEINURIA, UNSPECIFIED TRIMESTER: ICD-10-CM

## 2023-01-12 DIAGNOSIS — Z21 ASYMPTOMATIC HUMAN IMMUNODEFICIENCY VIRUS [HIV] INFECTION STATUS: ICD-10-CM

## 2023-01-18 ENCOUNTER — NON-APPOINTMENT (OUTPATIENT)
Age: 33
End: 2023-01-18

## 2023-01-20 DIAGNOSIS — B96.89 ACUTE VAGINITIS: ICD-10-CM

## 2023-01-20 DIAGNOSIS — N76.0 ACUTE VAGINITIS: ICD-10-CM

## 2023-01-20 RX ORDER — METRONIDAZOLE 7.5 MG/G
0.75 GEL VAGINAL
Qty: 1 | Refills: 0 | Status: ACTIVE | COMMUNITY
Start: 2023-01-20 | End: 1900-01-01

## 2023-01-23 ENCOUNTER — NON-APPOINTMENT (OUTPATIENT)
Age: 33
End: 2023-01-23

## 2023-01-31 ENCOUNTER — NON-APPOINTMENT (OUTPATIENT)
Age: 33
End: 2023-01-31

## 2023-02-06 ENCOUNTER — NON-APPOINTMENT (OUTPATIENT)
Age: 33
End: 2023-02-06

## 2023-02-06 ENCOUNTER — APPOINTMENT (OUTPATIENT)
Dept: OBGYN | Facility: HOSPITAL | Age: 33
End: 2023-02-06

## 2023-02-14 ENCOUNTER — APPOINTMENT (OUTPATIENT)
Dept: CARDIOLOGY | Facility: HOSPITAL | Age: 33
End: 2023-02-14

## 2023-02-28 NOTE — ED ADULT TRIAGE NOTE - RESPIRATORY RATE (BREATHS/MIN)
Problem: Adult Inpatient Plan of Care  Goal: Plan of Care Review  Outcome: Ongoing, Progressing  Flowsheets (Taken 2/28/2023 1325)  Plan of Care Reviewed With:   patient   spouse  Goal: Patient-Specific Goal (Individualized)  Outcome: Ongoing, Progressing  Flowsheets (Taken 2/28/2023 1325)  Anxieties, Fears or Concerns: communicate needs appropriately and timely  Individualized Care Needs: Prevent falling, strengthen gait  Goal: Optimal Comfort and Wellbeing  Outcome: Ongoing, Progressing  Intervention: Provide Person-Centered Care  Flowsheets (Taken 2/28/2023 1325)  Trust Relationship/Rapport:   care explained   emotional support provided   empathic listening provided   thoughts/feelings acknowledged   reassurance provided   questions encouraged   questions answered     Problem: Fall Injury Risk  Goal: Absence of Fall and Fall-Related Injury  Outcome: Ongoing, Progressing  Intervention: Identify and Manage Contributors  Flowsheets (Taken 2/28/2023 1325)  Self-Care Promotion:   independence encouraged   BADL personal objects within reach  Intervention: Promote Injury-Free Environment  Flowsheets (Taken 2/28/2023 1325)  Safety Promotion/Fall Prevention:   assistive device/personal item within reach   bed alarm set   gait belt with ambulation   Fall Risk reviewed with patient/family   nonskid shoes/socks when out of bed   room near unit station   /camera at bedside   instructed to call staff for mobility      18

## 2023-07-12 NOTE — ED ADULT NURSE NOTE - DOES PATIENT HAVE ADVANCE DIRECTIVE
REVIEW OF CARDIOVASCULAR SYSTEMS:  Cardiovascular problem(s): Shortness of Breath on Exertion    Patient does not smoke.    Patient does take aspirin.     No

## 2023-07-28 ENCOUNTER — NON-APPOINTMENT (OUTPATIENT)
Age: 33
End: 2023-07-28

## 2023-11-22 NOTE — OB RN DELIVERY SUMMARY - AS DELIV COMPLICATIONS OB
Transitional Care Management Telephone Call Attempt    Discharge Date: 11/21/23  Discharge Location: [facility]: [facility]    Call Attempt Date: 11/22/2023  Call Attempt: First
+ HIV/other

## 2025-02-04 NOTE — OB RN PATIENT PROFILE - BREASTFEEDING PROVIDES MATERNAL HEALTH BENEFITS, DECREASED PREMENOPAUSAL BREAST CANCER, OVARIAN CANCER AND TYPE II DIABETES MELLITUS
Birth control and albuterol were sent to the pharmacy per protocol.     Medication(s) Requested: vyvanse 20 and 10 mg  Last office visit: 12/11/24  Next visit: 3/3/25  Last refill: 1/6/25  Is the patient due for refill of this medication(s): Yes  PDMP review: Criteria met. Forwarded to Physician/VONDA for signature.     
Preferred pharmacy verified and updated.    Pt advised to check with pharmacy first before picking up prescriptions.      Pt advised their refill will be addressed within 72 business hours.    Please call patient back if any questions, comments or concerns.    Telephone:    CELL: [phone]           
Statement Selected
